# Patient Record
Sex: MALE | Race: AMERICAN INDIAN OR ALASKA NATIVE | NOT HISPANIC OR LATINO | URBAN - METROPOLITAN AREA
[De-identification: names, ages, dates, MRNs, and addresses within clinical notes are randomized per-mention and may not be internally consistent; named-entity substitution may affect disease eponyms.]

---

## 2019-12-11 ENCOUNTER — INPATIENT (INPATIENT)
Facility: HOSPITAL | Age: 49
LOS: 5 days | Discharge: HOME | End: 2019-12-17
Attending: INTERNAL MEDICINE | Admitting: INTERNAL MEDICINE
Payer: MEDICAID

## 2019-12-11 VITALS
OXYGEN SATURATION: 100 % | SYSTOLIC BLOOD PRESSURE: 111 MMHG | HEART RATE: 109 BPM | TEMPERATURE: 98 F | DIASTOLIC BLOOD PRESSURE: 74 MMHG | RESPIRATION RATE: 18 BRPM

## 2019-12-11 LAB
ALBUMIN SERPL ELPH-MCNC: 3.4 G/DL — LOW (ref 3.5–5.2)
ALBUMIN SERPL ELPH-MCNC: 3.5 G/DL — SIGNIFICANT CHANGE UP (ref 3.5–5.2)
ALP SERPL-CCNC: 376 U/L — HIGH (ref 30–115)
ALP SERPL-CCNC: 381 U/L — HIGH (ref 30–115)
ALT FLD-CCNC: 113 U/L — HIGH (ref 0–41)
ALT FLD-CCNC: 114 U/L — HIGH (ref 0–41)
ANION GAP SERPL CALC-SCNC: 12 MMOL/L — SIGNIFICANT CHANGE UP (ref 7–14)
ANION GAP SERPL CALC-SCNC: 16 MMOL/L — HIGH (ref 7–14)
APTT BLD: 31.7 SEC — SIGNIFICANT CHANGE UP (ref 27–39.2)
AST SERPL-CCNC: 66 U/L — HIGH (ref 0–41)
AST SERPL-CCNC: 66 U/L — HIGH (ref 0–41)
B-OH-BUTYR SERPL-SCNC: 0.5 MMOL/L — HIGH
BASE EXCESS BLDV CALC-SCNC: 6.8 MMOL/L — HIGH (ref -2–2)
BASOPHILS # BLD AUTO: 0.05 K/UL — SIGNIFICANT CHANGE UP (ref 0–0.2)
BASOPHILS NFR BLD AUTO: 0.7 % — SIGNIFICANT CHANGE UP (ref 0–1)
BILIRUB DIRECT SERPL-MCNC: >17.2 MG/DL — SIGNIFICANT CHANGE UP (ref 0–0.2)
BILIRUB DIRECT SERPL-MCNC: >8.6 MG/DL — HIGH (ref 0–0.2)
BILIRUB INDIRECT FLD-MCNC: SIGNIFICANT CHANGE UP MG/DL (ref 0.2–1.2)
BILIRUB INDIRECT FLD-MCNC: SIGNIFICANT CHANGE UP MG/DL (ref 0.2–1.2)
BILIRUB SERPL-MCNC: 31.7 MG/DL — CRITICAL HIGH (ref 0.2–1.2)
BILIRUB SERPL-MCNC: 32.4 MG/DL — CRITICAL HIGH (ref 0.2–1.2)
BLD GP AB SCN SERPL QL: SIGNIFICANT CHANGE UP
BUN SERPL-MCNC: 12 MG/DL — SIGNIFICANT CHANGE UP (ref 10–20)
BUN SERPL-MCNC: 13 MG/DL — SIGNIFICANT CHANGE UP (ref 10–20)
CA-I SERPL-SCNC: 1.23 MMOL/L — SIGNIFICANT CHANGE UP (ref 1.12–1.3)
CALCIUM SERPL-MCNC: 8.2 MG/DL — LOW (ref 8.5–10.1)
CALCIUM SERPL-MCNC: 9.4 MG/DL — SIGNIFICANT CHANGE UP (ref 8.5–10.1)
CHLORIDE SERPL-SCNC: 90 MMOL/L — LOW (ref 98–110)
CHLORIDE SERPL-SCNC: 99 MMOL/L — SIGNIFICANT CHANGE UP (ref 98–110)
CO2 SERPL-SCNC: 25 MMOL/L — SIGNIFICANT CHANGE UP (ref 17–32)
CO2 SERPL-SCNC: 27 MMOL/L — SIGNIFICANT CHANGE UP (ref 17–32)
CREAT SERPL-MCNC: <0.5 MG/DL — LOW (ref 0.7–1.5)
CREAT SERPL-MCNC: <0.5 MG/DL — LOW (ref 0.7–1.5)
EOSINOPHIL # BLD AUTO: 0.01 K/UL — SIGNIFICANT CHANGE UP (ref 0–0.7)
EOSINOPHIL NFR BLD AUTO: 0.1 % — SIGNIFICANT CHANGE UP (ref 0–8)
GAS PNL BLDV: 139 MMOL/L — SIGNIFICANT CHANGE UP (ref 136–145)
GAS PNL BLDV: SIGNIFICANT CHANGE UP
GLUCOSE SERPL-MCNC: 450 MG/DL — CRITICAL HIGH (ref 70–99)
GLUCOSE SERPL-MCNC: 620 MG/DL — CRITICAL HIGH (ref 70–99)
HCO3 BLDV-SCNC: 34 MMOL/L — HIGH (ref 22–29)
HCT VFR BLD CALC: 31.8 % — LOW (ref 42–52)
HCT VFR BLDA CALC: 37.1 % — SIGNIFICANT CHANGE UP (ref 34–44)
HGB BLD CALC-MCNC: 12.1 G/DL — LOW (ref 14–18)
HGB BLD-MCNC: 11 G/DL — LOW (ref 14–18)
IMM GRANULOCYTES NFR BLD AUTO: 0.4 % — HIGH (ref 0.1–0.3)
INR BLD: 0.94 RATIO — SIGNIFICANT CHANGE UP (ref 0.65–1.3)
LACTATE BLDV-MCNC: 1.8 MMOL/L — HIGH (ref 0.5–1.6)
LACTATE SERPL-SCNC: 1.9 MMOL/L — SIGNIFICANT CHANGE UP (ref 0.7–2)
LIDOCAIN IGE QN: 4 U/L — LOW (ref 7–60)
LYMPHOCYTES # BLD AUTO: 0.87 K/UL — LOW (ref 1.2–3.4)
LYMPHOCYTES # BLD AUTO: 11.9 % — LOW (ref 20.5–51.1)
MAGNESIUM SERPL-MCNC: 2.1 MG/DL — SIGNIFICANT CHANGE UP (ref 1.8–2.4)
MCHC RBC-ENTMCNC: 30.5 PG — SIGNIFICANT CHANGE UP (ref 27–31)
MCHC RBC-ENTMCNC: 34.6 G/DL — SIGNIFICANT CHANGE UP (ref 32–37)
MCV RBC AUTO: 88.1 FL — SIGNIFICANT CHANGE UP (ref 80–94)
MONOCYTES # BLD AUTO: 0.65 K/UL — HIGH (ref 0.1–0.6)
MONOCYTES NFR BLD AUTO: 8.9 % — SIGNIFICANT CHANGE UP (ref 1.7–9.3)
NEUTROPHILS # BLD AUTO: 5.68 K/UL — SIGNIFICANT CHANGE UP (ref 1.4–6.5)
NEUTROPHILS NFR BLD AUTO: 78 % — HIGH (ref 42.2–75.2)
NRBC # BLD: 0 /100 WBCS — SIGNIFICANT CHANGE UP (ref 0–0)
PCO2 BLDV: 60 MMHG — HIGH (ref 41–51)
PH BLDV: 7.36 — SIGNIFICANT CHANGE UP (ref 7.26–7.43)
PLATELET # BLD AUTO: 437 K/UL — HIGH (ref 130–400)
PO2 BLDV: 20 MMHG — SIGNIFICANT CHANGE UP (ref 20–40)
POTASSIUM BLDV-SCNC: 3.8 MMOL/L — SIGNIFICANT CHANGE UP (ref 3.3–5.6)
POTASSIUM SERPL-MCNC: 4 MMOL/L — SIGNIFICANT CHANGE UP (ref 3.5–5)
POTASSIUM SERPL-MCNC: 4.5 MMOL/L — SIGNIFICANT CHANGE UP (ref 3.5–5)
POTASSIUM SERPL-SCNC: 4 MMOL/L — SIGNIFICANT CHANGE UP (ref 3.5–5)
POTASSIUM SERPL-SCNC: 4.5 MMOL/L — SIGNIFICANT CHANGE UP (ref 3.5–5)
PROT SERPL-MCNC: 5.8 G/DL — LOW (ref 6–8)
PROT SERPL-MCNC: 5.9 G/DL — LOW (ref 6–8)
PROTHROM AB SERPL-ACNC: 10.8 SEC — SIGNIFICANT CHANGE UP (ref 9.95–12.87)
RBC # BLD: 3.61 M/UL — LOW (ref 4.7–6.1)
RBC # FLD: 19.1 % — HIGH (ref 11.5–14.5)
SAO2 % BLDV: 28 % — SIGNIFICANT CHANGE UP
SODIUM SERPL-SCNC: 133 MMOL/L — LOW (ref 135–146)
SODIUM SERPL-SCNC: 136 MMOL/L — SIGNIFICANT CHANGE UP (ref 135–146)
WBC # BLD: 7.29 K/UL — SIGNIFICANT CHANGE UP (ref 4.8–10.8)
WBC # FLD AUTO: 7.29 K/UL — SIGNIFICANT CHANGE UP (ref 4.8–10.8)

## 2019-12-11 PROCEDURE — 71045 X-RAY EXAM CHEST 1 VIEW: CPT | Mod: 26

## 2019-12-11 PROCEDURE — 93010 ELECTROCARDIOGRAM REPORT: CPT

## 2019-12-11 PROCEDURE — 99284 EMERGENCY DEPT VISIT MOD MDM: CPT

## 2019-12-11 PROCEDURE — 74177 CT ABD & PELVIS W/CONTRAST: CPT | Mod: 26

## 2019-12-11 PROCEDURE — 93979 VASCULAR STUDY: CPT | Mod: 26

## 2019-12-11 RX ORDER — SODIUM CHLORIDE 9 MG/ML
1000 INJECTION INTRAMUSCULAR; INTRAVENOUS; SUBCUTANEOUS
Refills: 0 | Status: DISCONTINUED | OUTPATIENT
Start: 2019-12-11 | End: 2019-12-13

## 2019-12-11 RX ORDER — SODIUM CHLORIDE 9 MG/ML
2000 INJECTION INTRAMUSCULAR; INTRAVENOUS; SUBCUTANEOUS ONCE
Refills: 0 | Status: COMPLETED | OUTPATIENT
Start: 2019-12-11 | End: 2019-12-11

## 2019-12-11 RX ORDER — INSULIN HUMAN 100 [IU]/ML
5 INJECTION, SOLUTION SUBCUTANEOUS ONCE
Refills: 0 | Status: COMPLETED | OUTPATIENT
Start: 2019-12-11 | End: 2019-12-11

## 2019-12-11 RX ADMIN — SODIUM CHLORIDE 2000 MILLILITER(S): 9 INJECTION INTRAMUSCULAR; INTRAVENOUS; SUBCUTANEOUS at 18:28

## 2019-12-11 RX ADMIN — SODIUM CHLORIDE 150 MILLILITER(S): 9 INJECTION INTRAMUSCULAR; INTRAVENOUS; SUBCUTANEOUS at 20:06

## 2019-12-11 RX ADMIN — INSULIN HUMAN 5 UNIT(S): 100 INJECTION, SOLUTION SUBCUTANEOUS at 22:04

## 2019-12-11 RX ADMIN — INSULIN HUMAN 5 UNIT(S): 100 INJECTION, SOLUTION SUBCUTANEOUS at 18:30

## 2019-12-11 NOTE — ED PROVIDER NOTE - OBJECTIVE STATEMENT
49 y.o. m w/ no known PMHx, never seen a doctor in over 30 yrs presents with the chief complaint of black stools, jaundice, black urine, and generalized weakness for 2 weeks that all happened acute in onset, he noticed when he woke up from sleep and looked in mirror. It has been progressively worsening. Because of problems getting insurance, he didn't get evaluated till now. Patient also endorses mild 2/10 stinging pain in the right upper quadrant, nonradiating, waxes and wanes for 2 weeks. Patient also admits to mild shortness of breath. Patient also admits to smoking marijuana, quit 5 years ago, smoked 1/2 blunt per day for 15 years. Patient denies any fevers, chills, or night sweats. Patient denies any chest pain, palpitations. Patient denies any nausea, vomiting, or diarrhea.     PMD- none  Pharm- none 49 y.o. m w/ no known PMHx, never seen a doctor in over 30 yrs presents with the chief complaint of black stools, jaundice, black urine, and generalized weakness for 2 weeks that all happened acute in onset, he noticed when he woke up from sleep and looked in mirror. It has been progressively worsening. Because of problems getting insurance, he didn't get evaluated till now. Patient also endorses mild 2/10 stinging pain in the right upper quadrant, non-radiating, waxes and wanes for 2 weeks. Patient also admits to mild shortness of breath. Patient also admits to smoking marijuana, quit 5 years ago, smoked 1/2 blunt per day for 15 years. Patient also admits to 40 lbs weight loss over 3 months. Patient denies any fevers, chills, or night sweats. Patient denies any chest pain, palpitations. Patient denies any nausea, vomiting, or diarrhea.     PMD- none  Pharm- none

## 2019-12-11 NOTE — ED PROVIDER NOTE - ATTENDING CONTRIBUTION TO CARE
50 yo m with no reported pmh, presents with painless jaundice.  pt says noted 2 weeks ago, but did not have insurance to come to hospital.  jaundice worsening.  pt denies n/v/d.  +significant weight loss.  no cp, no sob, no leg swelling or pain.  exam: cachectic, nad, ncat, perrl, eomi, dry mm, rrr, ctab, abd soft, nt,nd aox3, jaundiced, scleral icterus imp; pt with painless jaundice, weight loss, concern for malignancy.  labs, ct a/p

## 2019-12-11 NOTE — ED PROVIDER NOTE - PHYSICAL EXAMINATION
Gen- thin, cachectic, malnourished looking male, diffusely jaundice  Skin- diffuse upper truncal petechiae  Head- atraumatic, normocephalic  Eyes- scleral icterus appreciated  ENT- normal oropharynx  Cardio- normal S1, S2  Pulm- CTA B/L  Abd- nontender, nondistended  Ext- no cyanosis or edema  Neuro- AOx4  Psych- cooperative, appropriate  GUERO negative for blood

## 2019-12-11 NOTE — ED PROVIDER NOTE - CARE PLAN
Relevant Problems   No relevant active problems       Anesthetic History   No history of anesthetic complications            Review of Systems / Medical History  Patient summary reviewed, nursing notes reviewed and pertinent labs reviewed    Pulmonary  Within defined limits                 Neuro/Psych   Within defined limits           Cardiovascular                  Exercise tolerance: >4 METS     GI/Hepatic/Renal     GERD: well controlled    Renal disease: stones and CRI       Endo/Other  Within defined limits           Other Findings            Physical Exam    Airway  Mallampati: II  TM Distance: 4 - 6 cm  Neck ROM: normal range of motion   Mouth opening: Normal     Cardiovascular    Rhythm: regular  Rate: normal         Dental      Comments: misssing many teeth on lower and upper back right   Pulmonary  Breath sounds clear to auscultation               Abdominal  GI exam deferred       Other Findings            Anesthetic Plan    ASA: 2  Anesthesia type: general          Induction: Intravenous  Anesthetic plan and risks discussed with: Patient Principal Discharge DX:	Pancreatic mass  Secondary Diagnosis:	Jaundice  Secondary Diagnosis:	Hyperglycemia

## 2019-12-11 NOTE — ED PROVIDER NOTE - CLINICAL SUMMARY MEDICAL DECISION MAKING FREE TEXT BOX
Pt with painless jaundice, found to have pancreatic mass, ill defined on CT, also with hyperglycemia, mild anion gap initially that resolved with ivf on rpt labs, glucose improving with fluids and insulin, ICU consulted but deemed stable for floor

## 2019-12-11 NOTE — ED ADULT NURSE NOTE - OBJECTIVE STATEMENT
49 year old male presents to the er with jaundice for two weeks & weakness. states hes been having bloody stools for about a month. patient states he has intermittent sob, and non radiating chest pain. patient denies any abdominal pain, states he has dark orange urine denies dysuira. denies any drug use or alcohol . states he hasn't seen a md for over a decade

## 2019-12-12 LAB
ALBUMIN SERPL ELPH-MCNC: 3.3 G/DL — LOW (ref 3.5–5.2)
ALP SERPL-CCNC: 353 U/L — HIGH (ref 30–115)
ALT FLD-CCNC: 103 U/L — HIGH (ref 0–41)
ANION GAP SERPL CALC-SCNC: 15 MMOL/L — HIGH (ref 7–14)
ANION GAP SERPL CALC-SCNC: 16 MMOL/L — HIGH (ref 7–14)
APTT BLD: 29.5 SEC — SIGNIFICANT CHANGE UP (ref 27–39.2)
AST SERPL-CCNC: 62 U/L — HIGH (ref 0–41)
BASOPHILS # BLD AUTO: 0.07 K/UL — SIGNIFICANT CHANGE UP (ref 0–0.2)
BASOPHILS NFR BLD AUTO: 1 % — SIGNIFICANT CHANGE UP (ref 0–1)
BILIRUB DIRECT SERPL-MCNC: >17.2 MG/DL — HIGH (ref 0–0.2)
BILIRUB INDIRECT FLD-MCNC: <10.8 MG/DL — HIGH (ref 0.2–1.2)
BILIRUB SERPL-MCNC: 28 MG/DL — CRITICAL HIGH (ref 0.2–1.2)
BUN SERPL-MCNC: 11 MG/DL — SIGNIFICANT CHANGE UP (ref 10–20)
BUN SERPL-MCNC: 9 MG/DL — LOW (ref 10–20)
CALCIUM SERPL-MCNC: 8.4 MG/DL — LOW (ref 8.5–10.1)
CALCIUM SERPL-MCNC: 8.4 MG/DL — LOW (ref 8.5–10.1)
CHLORIDE SERPL-SCNC: 95 MMOL/L — LOW (ref 98–110)
CHLORIDE SERPL-SCNC: 99 MMOL/L — SIGNIFICANT CHANGE UP (ref 98–110)
CO2 SERPL-SCNC: 21 MMOL/L — SIGNIFICANT CHANGE UP (ref 17–32)
CO2 SERPL-SCNC: 23 MMOL/L — SIGNIFICANT CHANGE UP (ref 17–32)
CREAT SERPL-MCNC: 0.5 MG/DL — LOW (ref 0.7–1.5)
CREAT SERPL-MCNC: 0.7 MG/DL — SIGNIFICANT CHANGE UP (ref 0.7–1.5)
EOSINOPHIL # BLD AUTO: 0.08 K/UL — SIGNIFICANT CHANGE UP (ref 0–0.7)
EOSINOPHIL NFR BLD AUTO: 1.1 % — SIGNIFICANT CHANGE UP (ref 0–8)
ESTIMATED AVERAGE GLUCOSE: 197 MG/DL — HIGH (ref 68–114)
GLUCOSE BLDC GLUCOMTR-MCNC: 221 MG/DL — HIGH (ref 70–99)
GLUCOSE BLDC GLUCOMTR-MCNC: 242 MG/DL — HIGH (ref 70–99)
GLUCOSE BLDC GLUCOMTR-MCNC: 296 MG/DL — HIGH (ref 70–99)
GLUCOSE BLDC GLUCOMTR-MCNC: 309 MG/DL — HIGH (ref 70–99)
GLUCOSE BLDC GLUCOMTR-MCNC: 342 MG/DL — HIGH (ref 70–99)
GLUCOSE BLDC GLUCOMTR-MCNC: 402 MG/DL — HIGH (ref 70–99)
GLUCOSE SERPL-MCNC: 234 MG/DL — HIGH (ref 70–99)
GLUCOSE SERPL-MCNC: 364 MG/DL — HIGH (ref 70–99)
HBA1C BLD-MCNC: 8.5 % — HIGH (ref 4–5.6)
HCT VFR BLD CALC: 30.3 % — LOW (ref 42–52)
HGB BLD-MCNC: 10.4 G/DL — LOW (ref 14–18)
HIV 1+2 AB+HIV1 P24 AG SERPL QL IA: SIGNIFICANT CHANGE UP
IMM GRANULOCYTES NFR BLD AUTO: 0.4 % — HIGH (ref 0.1–0.3)
INR BLD: 0.94 RATIO — SIGNIFICANT CHANGE UP (ref 0.65–1.3)
LYMPHOCYTES # BLD AUTO: 1.31 K/UL — SIGNIFICANT CHANGE UP (ref 1.2–3.4)
LYMPHOCYTES # BLD AUTO: 18.2 % — LOW (ref 20.5–51.1)
MAGNESIUM SERPL-MCNC: 1.8 MG/DL — SIGNIFICANT CHANGE UP (ref 1.8–2.4)
MCHC RBC-ENTMCNC: 30.1 PG — SIGNIFICANT CHANGE UP (ref 27–31)
MCHC RBC-ENTMCNC: 34.3 G/DL — SIGNIFICANT CHANGE UP (ref 32–37)
MCV RBC AUTO: 87.6 FL — SIGNIFICANT CHANGE UP (ref 80–94)
MONOCYTES # BLD AUTO: 0.71 K/UL — HIGH (ref 0.1–0.6)
MONOCYTES NFR BLD AUTO: 9.9 % — HIGH (ref 1.7–9.3)
NEUTROPHILS # BLD AUTO: 4.99 K/UL — SIGNIFICANT CHANGE UP (ref 1.4–6.5)
NEUTROPHILS NFR BLD AUTO: 69.4 % — SIGNIFICANT CHANGE UP (ref 42.2–75.2)
NRBC # BLD: 0 /100 WBCS — SIGNIFICANT CHANGE UP (ref 0–0)
PLATELET # BLD AUTO: 408 K/UL — HIGH (ref 130–400)
POTASSIUM SERPL-MCNC: 3.4 MMOL/L — LOW (ref 3.5–5)
POTASSIUM SERPL-MCNC: 3.7 MMOL/L — SIGNIFICANT CHANGE UP (ref 3.5–5)
POTASSIUM SERPL-SCNC: 3.4 MMOL/L — LOW (ref 3.5–5)
POTASSIUM SERPL-SCNC: 3.7 MMOL/L — SIGNIFICANT CHANGE UP (ref 3.5–5)
PROT SERPL-MCNC: 5.5 G/DL — LOW (ref 6–8)
PROTHROM AB SERPL-ACNC: 10.8 SEC — SIGNIFICANT CHANGE UP (ref 9.95–12.87)
RBC # BLD: 3.46 M/UL — LOW (ref 4.7–6.1)
RBC # FLD: 20.6 % — HIGH (ref 11.5–14.5)
SODIUM SERPL-SCNC: 134 MMOL/L — LOW (ref 135–146)
SODIUM SERPL-SCNC: 135 MMOL/L — SIGNIFICANT CHANGE UP (ref 135–146)
WBC # BLD: 7.19 K/UL — SIGNIFICANT CHANGE UP (ref 4.8–10.8)
WBC # FLD AUTO: 7.19 K/UL — SIGNIFICANT CHANGE UP (ref 4.8–10.8)

## 2019-12-12 PROCEDURE — 73030 X-RAY EXAM OF SHOULDER: CPT | Mod: 26,50

## 2019-12-12 PROCEDURE — 99223 1ST HOSP IP/OBS HIGH 75: CPT

## 2019-12-12 PROCEDURE — 74181 MRI ABDOMEN W/O CONTRAST: CPT | Mod: 26

## 2019-12-12 RX ORDER — ENOXAPARIN SODIUM 100 MG/ML
40 INJECTION SUBCUTANEOUS DAILY
Refills: 0 | Status: DISCONTINUED | OUTPATIENT
Start: 2019-12-12 | End: 2019-12-17

## 2019-12-12 RX ORDER — DEXTROSE 50 % IN WATER 50 %
25 SYRINGE (ML) INTRAVENOUS ONCE
Refills: 0 | Status: DISCONTINUED | OUTPATIENT
Start: 2019-12-12 | End: 2019-12-13

## 2019-12-12 RX ORDER — DEXTROSE 50 % IN WATER 50 %
12.5 SYRINGE (ML) INTRAVENOUS ONCE
Refills: 0 | Status: DISCONTINUED | OUTPATIENT
Start: 2019-12-12 | End: 2019-12-13

## 2019-12-12 RX ORDER — GLUCAGON INJECTION, SOLUTION 0.5 MG/.1ML
1 INJECTION, SOLUTION SUBCUTANEOUS ONCE
Refills: 0 | Status: DISCONTINUED | OUTPATIENT
Start: 2019-12-12 | End: 2019-12-17

## 2019-12-12 RX ORDER — INSULIN GLARGINE 100 [IU]/ML
15 INJECTION, SOLUTION SUBCUTANEOUS AT BEDTIME
Refills: 0 | Status: DISCONTINUED | OUTPATIENT
Start: 2019-12-12 | End: 2019-12-14

## 2019-12-12 RX ORDER — INSULIN LISPRO 100/ML
VIAL (ML) SUBCUTANEOUS
Refills: 0 | Status: DISCONTINUED | OUTPATIENT
Start: 2019-12-12 | End: 2019-12-17

## 2019-12-12 RX ORDER — INSULIN LISPRO 100/ML
8 VIAL (ML) SUBCUTANEOUS ONCE
Refills: 0 | Status: COMPLETED | OUTPATIENT
Start: 2019-12-12 | End: 2019-12-12

## 2019-12-12 RX ORDER — POTASSIUM CHLORIDE 20 MEQ
20 PACKET (EA) ORAL
Refills: 0 | Status: COMPLETED | OUTPATIENT
Start: 2019-12-12 | End: 2019-12-12

## 2019-12-12 RX ORDER — SODIUM CHLORIDE 9 MG/ML
1000 INJECTION, SOLUTION INTRAVENOUS
Refills: 0 | Status: DISCONTINUED | OUTPATIENT
Start: 2019-12-12 | End: 2019-12-13

## 2019-12-12 RX ORDER — INSULIN LISPRO 100/ML
5 VIAL (ML) SUBCUTANEOUS
Refills: 0 | Status: DISCONTINUED | OUTPATIENT
Start: 2019-12-12 | End: 2019-12-15

## 2019-12-12 RX ORDER — DEXTROSE 50 % IN WATER 50 %
15 SYRINGE (ML) INTRAVENOUS ONCE
Refills: 0 | Status: DISCONTINUED | OUTPATIENT
Start: 2019-12-12 | End: 2019-12-13

## 2019-12-12 RX ADMIN — Medication 3: at 18:15

## 2019-12-12 RX ADMIN — Medication 50 MILLIEQUIVALENT(S): at 14:40

## 2019-12-12 RX ADMIN — SODIUM CHLORIDE 150 MILLILITER(S): 9 INJECTION INTRAMUSCULAR; INTRAVENOUS; SUBCUTANEOUS at 02:27

## 2019-12-12 RX ADMIN — Medication 5 UNIT(S): at 18:15

## 2019-12-12 RX ADMIN — Medication 4: at 12:07

## 2019-12-12 RX ADMIN — Medication 8 UNIT(S): at 23:29

## 2019-12-12 RX ADMIN — SODIUM CHLORIDE 150 MILLILITER(S): 9 INJECTION INTRAMUSCULAR; INTRAVENOUS; SUBCUTANEOUS at 12:08

## 2019-12-12 RX ADMIN — INSULIN GLARGINE 15 UNIT(S): 100 INJECTION, SOLUTION SUBCUTANEOUS at 23:29

## 2019-12-12 RX ADMIN — Medication 5 UNIT(S): at 12:08

## 2019-12-12 RX ADMIN — Medication 5 UNIT(S): at 08:48

## 2019-12-12 RX ADMIN — Medication 50 MILLIEQUIVALENT(S): at 16:21

## 2019-12-12 RX ADMIN — Medication 2: at 08:46

## 2019-12-12 NOTE — H&P ADULT - ASSESSMENT
49 year old M with no known PMHx presents to the ED for evaluation of jaundice.    # Painless obstructive jaundice:  - cholestatic transaminitis with total bili 32.4 predominantly direct, , AST 66,   - CT abdomen is highly suspicious of pancreatic head mass with CBD dilation to 1.7, pancreatic duct dilation, intra and extra hepatic duct dilation  - will trend hepatic function, INR daily  - will get [facility] for evaluation of ERCP + Stent + EUS    # Hyperglycemia:   - not on any medications, insulin regimen to control, monitor FS  - AG improved from 16 to 12, will get HbA1C    DVT ppx: Lovenox  GI ppx: ot indicated  from home  full code  CHO consistent diet

## 2019-12-12 NOTE — H&P ADULT - NSHPLABSRESULTS_GEN_ALL_CORE
< end of copied text >    < from: CT Abdomen and Pelvis w/ IV Cont (12.11.19 @ 19:18) >      IMPRESSION:     Severe pancreatic duct dilatation measuring 9 mm with distal pancreatic   atrophy.    Intrahepatic and extrahepatic biliary duct dilatation with the common   bile duct measuring up to 1.7 cm in the head of the pancreas.    Abrupt cut off of the pancreatic duct and common bile duct in the head of   pancreas, findings are highly suspicious for pancreatic head mass. A   discrete lesion is difficult to identify on this examination.    Infiltration of retroperitoneal fat around the aorta, IVC, and large   vessels off the aorta. Significant loss of retroperitoneal and mesenteric   fat. Infiltration may be related to ascites and edema. However, malignant   ascites or infiltration is not excluded.    Subcentimeter nodules in the left lung measuring up to 8 mm.    Severe loss of subcutaneous, retroperitoneal and mesenteric fat    Stomach is severely distended with material    < end of copied text >      Basic Metabolic Panel - STAT (12.11.19 @ 19:30)    Sodium, Serum: 136 mmol/L    Potassium, Serum: 4.5: Hemolyzed. Interpret with caution mmol/L    Chloride, Serum: 99 mmol/L    Carbon Dioxide, Serum: 25 mmol/L    Anion Gap, Serum: 12 mmol/L    Blood Urea Nitrogen, Serum: 13 mg/dL    Creatinine, Serum: <0.5: Icteric. Interpret with caution mg/dL    Glucose, Serum: 450: Critical value:  TYPE:(C=Critical, N=Notification, A=Abnormal) _  TESTS: _  DATE/TIME CALLED: _  CALLED TO: _dr jimenez  READ BACK (2 Patient Identifiers)(Y/N): _  READ BACK VALUES (Y/N): _  CALLED BY: _ mg/dL    Calcium, Total Serum: 8.2 mg/dL    eGFR if Non : 185: Interpretative comment  The units for eGFR are mL/min/1.73M2 (normalized body surface area). The  eGFR is calculated from a serum creatinine using the CKD-EPI equation.  Other variables required for calculation are race, age and sex. Among  patients with chronic kidney disease (CKD), the eGFR is useful in  determining the stage of disease according to KDOQI CKD classification.  All eGFR results are reported numerically with the following  interpretation.          GFR                    With                 Without     (ml/min/1.73 m2)    Kidney Damage       Kidney Damage        >= 90                    Stage 1                     Normal        60-89                    Stage 2                     Decreased GFR        30-59     Stage 3                     Stage 3        15-29                    Stage 4                     Stage 4        < 15                      Stage 5                     Stage 5  Each stage of CKD assumes that the associated GFR level has been in  effect for at least 3 months. Determination of stages one and two (with  eGFR > 59 ml/min/m2) requires estimation of kidney damage for at least 3  months as defined by structural or functional abnormalities.  Limitations: All estimates of GFR will be less accurate for patients at  extremes of muscle mass (including but not limited to frail elderly,  critically ill, or cancer patients), those with unusual diets, and those  with conditions associated with reduced secretion or extrarenal  elimination of creatinine. The eGFR equation is not recommended for use  in patients with unstable creatinine levels. mL/min/1.73M2    eGFR if African American: 215 mL/min/1.73M2

## 2019-12-12 NOTE — H&P ADULT - HISTORY OF PRESENT ILLNESS
49 year old M with no known PMHx presents to the ED for evaluation of jaundice. patient reports he noticed diffuse jaundice 2 weeks ago with dark urine and stool. he also endorses generalized weakness over the past 1 month and loss of tremendous amount of wt over the past 6 months. for insurance issue he could not come to get evaluated. Patient also endorses mild 2/10 dull aching pain in the right upper quadrant, non-radiating, waxes and wanes for 2 weeks. denies fever, chills, nausea, abdominal pain, chest pain, sick contacts or recent travel. 49 year old M with no known PMHx presents to the ED for evaluation of jaundice. patient reports he noticed diffuse jaundice 2 weeks ago with dark urine and stool. he also endorses generalized weakness over the past 1 month and loss of tremendous amount of wt over the past 6 months. for insurance issue he could not come to get evaluated. Patient also endorses mild 2/10 dull aching pain in the right upper quadrant, non-radiating, waxes and wanes for 2 weeks. denies fever, chills, nausea, abdominal pain, chest pain, sick contacts or recent travel.     in the ED patient is hemodynamically stable.  found to be in mild DKA, received IV fluids and insulin in the ED.

## 2019-12-12 NOTE — CONSULT NOTE ADULT - SUBJECTIVE AND OBJECTIVE BOX
Patient is a 48 y/o with no known PMHx that presents to the ED for evaluation of jaundice. Patient notes for the last few weeks he has been jaundice. He has had approximately 40lb weight loss in a few month span. He notes no preventative medical visits and no prior CRC screen. He infrequent seeks medical care due to lack of insurance. He notes his stools have been oily and occasional black. He notes no actually dysphagia, abdominal pain, nausea, emesis, fever, chills, herbal medication use, drug/ ETOH use, or recent travel. He notes his father had required stenting of his bile ducts but was not sure why ( he notes his father passed away from DM complications without a know malignant process). Of note in the ED for to be in DKA.       PAST MEDICAL & SURGICAL HISTORY:  No pertinent past medical history  No significant past surgical history    Family Hx:  Father: Biliary stenting?   Mother: Non Contributory    Social History  Denies Current Tobacco use  Denies Current ETOH use  Denies Current Illicit Drug use     MEDICATIONS  (STANDING):  dextrose 5%. 1000 milliLiter(s) (50 mL/Hr) IV Continuous <Continuous>  dextrose 50% Injectable 12.5 Gram(s) IV Push once  dextrose 50% Injectable 25 Gram(s) IV Push once  dextrose 50% Injectable 25 Gram(s) IV Push once  enoxaparin Injectable 40 milliGRAM(s) SubCutaneous daily  insulin glargine Injectable (LANTUS) 15 Unit(s) SubCutaneous at bedtime  insulin lispro (HumaLOG) corrective regimen sliding scale   SubCutaneous three times a day before meals  insulin lispro Injectable (HumaLOG) 5 Unit(s) SubCutaneous three times a day before meals  potassium chloride  20 mEq/100 mL IVPB 20 milliEquivalent(s) IV Intermittent every 2 hours  sodium chloride 0.9%. 1000 milliLiter(s) (150 mL/Hr) IV Continuous <Continuous>    MEDICATIONS  (PRN):  dextrose 40% Gel 15 Gram(s) Oral once PRN Blood Glucose LESS THAN 70 milliGRAM(s)/deciliter  glucagon  Injectable 1 milliGRAM(s) IntraMuscular once PRN Glucose LESS THAN 70 milligrams/deciliter      Allergies  No Known Allergies        Review of Systems  General:  See HPI  HEENT: Denies Trouble Swallowing ,Denies  Sore Throat , Denies Change in hearing/vision/speech ,Denies Dizziness    Cardio: Denies  Chest Pain , Palpitations    Respiratory: Denies worsening of SOB, Denies Cough  Abdomen: See detailed HPI  Neuro: Denies Headache Denies Dizziness, Denies Paresthesias  MSK: Denies pain in Bones/Joints/Muscles   Psych: Patient denies depression, denies suicidal or homicidal ideations  Integ: Patient Denies rash, or new skin lesions     Vital Signs:  T(F): 98.5 (12 Dec 2019 07:56), Max: 98.5 (12 Dec 2019 07:56)  HR: 75 (12 Dec 2019 07:56) (75 - 109)  BP: 102/57 (12 Dec 2019 07:56) (102/57 - 119/80)  RR: 18 (12 Dec 2019 07:56) (18 - 18)  SpO2: 99% (12 Dec 2019 07:56) (98% - 100%)  Physical Exam  Gen: NAD  HEENT: NC/AT, Icteric Sclera   Neck: Supple  Cardio: S1/S2 No S3/S4, Regular  Resp: CTA B/L  Abdomen: Soft, ND/NT  Neuro: AAOx3  Extremities: FROM x 4  Integ: Jaundice noted, excoriations likely from itching       LABS:                        10.4   7.19  )-----------( 408      ( 12 Dec 2019 07:24 )             30.3     12-12    134<L>  |  95<L>  |  9<L>  ----------------------------<  234<H>  3.4<L>   |  23  |  0.5<L>    Ca    8.4<L>      12 Dec 2019 07:24  Mg     1.8     12-12    TPro  5.5<L>  /  Alb  3.3<L>  /  TBili  28.0<HH>  /  DBili  >17.2<H>  /  AST  62<H>  /  ALT  103<H>  /  AlkPhos  353<H>  12-12      PT/INR - ( 12 Dec 2019 07:24 )   PT: 10.80 sec;   INR: 0.94 ratio         PTT - ( 12 Dec 2019 07:24 )  PTT:29.5 sec      RADIOLOGY & ADDITIONAL STUDIES:  CT Abdomen and Pelvis w/ IV Cont 12.11.19   IMPRESSION:     Severe pancreatic duct dilatation measuring 9 mm with distal pancreatic   atrophy.    Intrahepatic and extrahepatic biliary duct dilatation with the common   bile duct measuring up to 1.7 cm in the head of the pancreas.    Abrupt cut off of the pancreatic duct and common bile duct in the head of   pancreas, findings are highly suspicious for pancreatic head mass. A   discrete lesion is difficult to identify on this examination.    Infiltration of retroperitoneal fat around the aorta, IVC, and large   vessels off the aorta. Significant loss of retroperitoneal and mesenteric   fat. Infiltration may be related to ascites and edema. However, malignant   ascites or infiltration is not excluded.    Subcentimeter nodules in the left lung measuring up to 8 mm.    Severe loss of subcutaneous, retroperitoneal and mesenteric fat    Stomach is severely distended with material

## 2019-12-12 NOTE — H&P ADULT - ATTENDING COMMENTS
The patient was seen and examined at bedside.  Agree with above a/p by resident.    A/P :  Obstructive Jaundice possibly 2/2 Pancreatic head mass  Complicated by  gastroparesis sec/2 DKA ( newly diagnosed, Hb A1c 8.5)    GI eval noted, f/u with MRCP > ERCP, Tumor markers.   No need for insulin drip, as Anion gap is getting closed.  c/w current SQ insulin regimen.  Monitor FS.    DVT/GI ppx.  Will follow.   Monitor FS,

## 2019-12-12 NOTE — H&P ADULT - NSHPPHYSICALEXAM_GEN_ALL_CORE
GENERAL: NAD, lying in bed comfortably, diffusely jaundice   CHEST/LUNG: Clear to auscultation bilaterally; No rales, rhonchi, wheezing, or rubs.   HEART: Regular rate and rhythm; No murmurs, rubs, or gallops  ABDOMEN: Bowel sounds present; Soft, Nontender, Nondistended.   EXTREMITIES:  2+ Peripheral Pulses, brisk capillary refill. No edema  NERVOUS SYSTEM:  Alert & Oriented X3, speech clear. No deficits

## 2019-12-12 NOTE — PATIENT PROFILE ADULT - BRADEN NUTRITION
Infusion Nursing Note:  Rachele Martínez presents today for Sandostatin.    Patient seen by provider today: No   present during visit today: Not Applicable.    Note: N/A.    Intravenous Access:  No Intravenous access/labs at this visit.    Treatment Conditions:  Not Applicable.    Post Infusion Assessment:  Patient tolerated injection without incident.  Site patent and intact, free from redness, edema or discomfort.    Discharge Plan:   Patient discharged in stable condition accompanied by: daughter.  Departure Mode: Ambulatory.    Holly Méndez RN                         (2) probably inadequate

## 2019-12-13 PROBLEM — Z00.00 ENCOUNTER FOR PREVENTIVE HEALTH EXAMINATION: Status: ACTIVE | Noted: 2019-12-13

## 2019-12-13 LAB
ALBUMIN SERPL ELPH-MCNC: 3.2 G/DL — LOW (ref 3.5–5.2)
ALP SERPL-CCNC: 359 U/L — HIGH (ref 30–115)
ALT FLD-CCNC: 101 U/L — HIGH (ref 0–41)
ANION GAP SERPL CALC-SCNC: 13 MMOL/L — SIGNIFICANT CHANGE UP (ref 7–14)
AST SERPL-CCNC: 66 U/L — HIGH (ref 0–41)
BASOPHILS # BLD AUTO: 0.07 K/UL — SIGNIFICANT CHANGE UP (ref 0–0.2)
BASOPHILS NFR BLD AUTO: 0.8 % — SIGNIFICANT CHANGE UP (ref 0–1)
BILIRUB DIRECT SERPL-MCNC: >17.2 MG/DL — HIGH (ref 0–0.2)
BILIRUB INDIRECT FLD-MCNC: <8.8 MG/DL — HIGH (ref 0.2–1.2)
BILIRUB SERPL-MCNC: 26 MG/DL — CRITICAL HIGH (ref 0.2–1.2)
BUN SERPL-MCNC: 11 MG/DL — SIGNIFICANT CHANGE UP (ref 10–20)
CALCIUM SERPL-MCNC: 8.8 MG/DL — SIGNIFICANT CHANGE UP (ref 8.5–10.1)
CANCER AG19-9 SERPL-ACNC: 617 U/ML — HIGH
CEA SERPL-MCNC: 10.3 NG/ML — HIGH (ref 0–3.8)
CHLORIDE SERPL-SCNC: 99 MMOL/L — SIGNIFICANT CHANGE UP (ref 98–110)
CO2 SERPL-SCNC: 25 MMOL/L — SIGNIFICANT CHANGE UP (ref 17–32)
CREAT SERPL-MCNC: 0.5 MG/DL — LOW (ref 0.7–1.5)
EOSINOPHIL # BLD AUTO: 0.1 K/UL — SIGNIFICANT CHANGE UP (ref 0–0.7)
EOSINOPHIL NFR BLD AUTO: 1.2 % — SIGNIFICANT CHANGE UP (ref 0–8)
GLUCOSE BLDC GLUCOMTR-MCNC: 126 MG/DL — HIGH (ref 70–99)
GLUCOSE BLDC GLUCOMTR-MCNC: 202 MG/DL — HIGH (ref 70–99)
GLUCOSE BLDC GLUCOMTR-MCNC: 211 MG/DL — HIGH (ref 70–99)
GLUCOSE BLDC GLUCOMTR-MCNC: 280 MG/DL — HIGH (ref 70–99)
GLUCOSE BLDC GLUCOMTR-MCNC: 316 MG/DL — HIGH (ref 70–99)
GLUCOSE BLDC GLUCOMTR-MCNC: 417 MG/DL — HIGH (ref 70–99)
GLUCOSE SERPL-MCNC: 135 MG/DL — HIGH (ref 70–99)
HCT VFR BLD CALC: 32.8 % — LOW (ref 42–52)
HGB BLD-MCNC: 11.4 G/DL — LOW (ref 14–18)
IMM GRANULOCYTES NFR BLD AUTO: 0.5 % — HIGH (ref 0.1–0.3)
INR BLD: 0.96 RATIO — SIGNIFICANT CHANGE UP (ref 0.65–1.3)
LYMPHOCYTES # BLD AUTO: 1.42 K/UL — SIGNIFICANT CHANGE UP (ref 1.2–3.4)
LYMPHOCYTES # BLD AUTO: 16.9 % — LOW (ref 20.5–51.1)
MAGNESIUM SERPL-MCNC: 1.8 MG/DL — SIGNIFICANT CHANGE UP (ref 1.8–2.4)
MCHC RBC-ENTMCNC: 30 PG — SIGNIFICANT CHANGE UP (ref 27–31)
MCHC RBC-ENTMCNC: 34.8 G/DL — SIGNIFICANT CHANGE UP (ref 32–37)
MCV RBC AUTO: 86.3 FL — SIGNIFICANT CHANGE UP (ref 80–94)
MONOCYTES # BLD AUTO: 0.95 K/UL — HIGH (ref 0.1–0.6)
MONOCYTES NFR BLD AUTO: 11.3 % — HIGH (ref 1.7–9.3)
NEUTROPHILS # BLD AUTO: 5.8 K/UL — SIGNIFICANT CHANGE UP (ref 1.4–6.5)
NEUTROPHILS NFR BLD AUTO: 69.3 % — SIGNIFICANT CHANGE UP (ref 42.2–75.2)
NRBC # BLD: 0 /100 WBCS — SIGNIFICANT CHANGE UP (ref 0–0)
PLATELET # BLD AUTO: 436 K/UL — HIGH (ref 130–400)
POTASSIUM SERPL-MCNC: 3.9 MMOL/L — SIGNIFICANT CHANGE UP (ref 3.5–5)
POTASSIUM SERPL-SCNC: 3.9 MMOL/L — SIGNIFICANT CHANGE UP (ref 3.5–5)
PROT SERPL-MCNC: 5.4 G/DL — LOW (ref 6–8)
PROTHROM AB SERPL-ACNC: 11.1 SEC — SIGNIFICANT CHANGE UP (ref 9.95–12.87)
RBC # BLD: 3.8 M/UL — LOW (ref 4.7–6.1)
RBC # FLD: 20.5 % — HIGH (ref 11.5–14.5)
SODIUM SERPL-SCNC: 137 MMOL/L — SIGNIFICANT CHANGE UP (ref 135–146)
WBC # BLD: 8.38 K/UL — SIGNIFICANT CHANGE UP (ref 4.8–10.8)
WBC # FLD AUTO: 8.38 K/UL — SIGNIFICANT CHANGE UP (ref 4.8–10.8)

## 2019-12-13 PROCEDURE — 99233 SBSQ HOSP IP/OBS HIGH 50: CPT

## 2019-12-13 RX ORDER — INSULIN LISPRO 100/ML
10 VIAL (ML) SUBCUTANEOUS ONCE
Refills: 0 | Status: COMPLETED | OUTPATIENT
Start: 2019-12-13 | End: 2019-12-13

## 2019-12-13 RX ORDER — PANTOPRAZOLE SODIUM 20 MG/1
40 TABLET, DELAYED RELEASE ORAL DAILY
Refills: 0 | Status: DISCONTINUED | OUTPATIENT
Start: 2019-12-13 | End: 2019-12-17

## 2019-12-13 RX ORDER — INSULIN LISPRO 100/ML
8 VIAL (ML) SUBCUTANEOUS ONCE
Refills: 0 | Status: COMPLETED | OUTPATIENT
Start: 2019-12-13 | End: 2019-12-13

## 2019-12-13 RX ADMIN — PANTOPRAZOLE SODIUM 40 MILLIGRAM(S): 20 TABLET, DELAYED RELEASE ORAL at 13:28

## 2019-12-13 RX ADMIN — Medication 8 UNIT(S): at 02:28

## 2019-12-13 RX ADMIN — Medication 50 MILLIEQUIVALENT(S): at 00:00

## 2019-12-13 RX ADMIN — INSULIN GLARGINE 15 UNIT(S): 100 INJECTION, SOLUTION SUBCUTANEOUS at 21:30

## 2019-12-13 RX ADMIN — Medication 10 UNIT(S): at 21:47

## 2019-12-13 NOTE — PROGRESS NOTE ADULT - SUBJECTIVE AND OBJECTIVE BOX
SUBJECTIVE:    Patient is a 49y old Male who presents with a chief complaint of pancreatic mass (12 Dec 2019 13:37)    Currently admitted to medicine with the primary diagnosis of Pancreatic mass     Today is hospital day 2d. This morning he is resting comfortably in bed and reports no new issues or overnight events.     PAST MEDICAL & SURGICAL HISTORY  No pertinent past medical history  No significant past surgical history    SOCIAL HISTORY:    ALLERGIES:  No Known Allergies    MEDICATIONS:  STANDING MEDICATIONS  dextrose 5%. 1000 milliLiter(s) IV Continuous <Continuous>  dextrose 50% Injectable 12.5 Gram(s) IV Push once  dextrose 50% Injectable 25 Gram(s) IV Push once  dextrose 50% Injectable 25 Gram(s) IV Push once  enoxaparin Injectable 40 milliGRAM(s) SubCutaneous daily  insulin glargine Injectable (LANTUS) 15 Unit(s) SubCutaneous at bedtime  insulin lispro (HumaLOG) corrective regimen sliding scale   SubCutaneous three times a day before meals  insulin lispro Injectable (HumaLOG) 5 Unit(s) SubCutaneous three times a day before meals  sodium chloride 0.9%. 1000 milliLiter(s) IV Continuous <Continuous>    PRN MEDICATIONS  dextrose 40% Gel 15 Gram(s) Oral once PRN  glucagon  Injectable 1 milliGRAM(s) IntraMuscular once PRN    VITALS:   T(F): 97.9  HR: 85  BP: 104/62  RR: 18  SpO2: 98%    LABS:                        10.4   7.19  )-----------( 408      ( 12 Dec 2019 07:24 )             30.3     12-12    134<L>  |  95<L>  |  9<L>  ----------------------------<  234<H>  3.4<L>   |  23  |  0.5<L>    Ca    8.4<L>      12 Dec 2019 07:24  Mg     1.8     12-12    TPro  5.5<L>  /  Alb  3.3<L>  /  TBili  28.0<HH>  /  DBili  >17.2<H>  /  AST  62<H>  /  ALT  103<H>  /  AlkPhos  353<H>  12-12    PT/INR - ( 12 Dec 2019 07:24 )   PT: 10.80 sec;   INR: 0.94 ratio         PTT - ( 12 Dec 2019 07:24 )  PTT:29.5 sec          Culture - Blood (collected 11 Dec 2019 16:30)  Source: .Blood Blood  Preliminary Report (13 Dec 2019 01:01):    No growth to date.              RADIOLOGY:    PHYSICAL EXAM:    GENERAL: NAD, lying in bed comfortably, diffusely jaundice   CHEST/LUNG: Clear to auscultation bilaterally; No rales, rhonchi, wheezing, or rubs.   HEART: Regular rate and rhythm; No murmurs, rubs, or gallops  ABDOMEN: Bowel sounds present; Soft, Nontender, Nondistended.   EXTREMITIES:  2+ Peripheral Pulses, brisk capillary refill. No edema  NERVOUS SYSTEM:  Alert & Oriented X3, speech clear. No deficits

## 2019-12-13 NOTE — DIETITIAN INITIAL EVALUATION ADULT. - ETIOLOGY
lethargy, poor appetite & abd discomfort, possibly related to underlying metabolic process (will monitor clinical course, no PMH noted)

## 2019-12-13 NOTE — DIETITIAN INITIAL EVALUATION ADULT. - OTHER INFO
Pertinent Medical Information: Admitted for evaluation of jaundice. Painless obstructive jaundice noted. Pancreatic mass noted. Hyperglycemia upon admission - elevated HgbA1C noted at this time.    Pertinent Subjective Information: Poor po intake over past 6 months d/t increasing weakness, poor appetite & abd discomfort/distention. Regular diet PTP. Demonstrates limited knowledge of nutrition concepts with no prior nutrition education. NKFA. UBW reported 63.6 kg, with unintentional wt loss occurring over past 6 months d/t aforementioned reasons. This wt change is 20% in 6 month duration (severe). <75% energy intake >1 month reported. No supplements reported.

## 2019-12-13 NOTE — DIETITIAN INITIAL EVALUATION ADULT. - PERTINENT LABORATORY DATA
12/13: RBC-3.80, H/H-11.4/32.8, creat-0.5, gluc-135, POCT gluc-316 (16:40) vs. 211 (11:34) vs. 126 (8:06) vs. 280 (2:06); 12/12: WhkQ5V-9.5%

## 2019-12-13 NOTE — PROGRESS NOTE ADULT - ASSESSMENT
# Painless, obstructive jaundice: likely panc mass  need ERCP w/ stenting and bx  no need to follow LFTs until after stenting  f/u w/ [facility] for ERCP + Stent + EUS/FNA (prob Mon - NPO p MN Sun; IVFs on Mon)  f/u CEA, CA 19-9  no pain  PPI po for heartburn  will need h/o eval once dx confirmed    # markedly distended stomach - no vomiting  could have been from diabetes not controlled  pt seems OK now  try to feed pt - can d/c IVFs for now  can use reglan prn  if starts to vomit - would use NGT to decompress w/ intermit suction    # Hyperglycemia: likely from panc insuff from mass; A1c 8.5  fs qac/hs and keep btw 100-180 - adjust insulin as needed  cont lant 15 and lisp 5/meal w/ +1 Cf scale  AG improved from 16 to 12  diabetic diet    # DVT ppx: Lovenox - will hold for ERCP    # GI ppx: ppi    # full code    Dispo: tx hyperglycemia; f/u ability to feed re distended stomach; set up w/ GI for ERCP for stent and bx    Prog: seems like it will be poor

## 2019-12-13 NOTE — DIETITIAN INITIAL EVALUATION ADULT. - RD TO REMAIN AVAILABLE
yes/Nutrition Intervention: Meals & Snacks, Medical Food Supplements, Coordination of Care, Nutrition Related Medication Management. Monitor: Energy intake, diet order, glucose profile, electrolyte profile, nutrition focused physical findings, body composition.

## 2019-12-13 NOTE — DIETITIAN INITIAL EVALUATION ADULT. - ENERGY NEEDS
Energy: 6341-3016 kcal/day (MSJx1.3-1.5 AF) - range chosen d/t BMI borderline underweight, pt meets malnutrition criteria    Protein: 60-70 g/day (1.2-1.4 g/kg ABW) - as above    Fluids: 1 mL/kcal

## 2019-12-13 NOTE — CHART NOTE - NSCHARTNOTEFT_GEN_A_CORE
Upon Nutritional Assessment by the Registered Dietitian your patient was determined to meet criteria / has evidence of the following diagnosis/diagnoses:          [ ]  Mild Protein Calorie Malnutrition        [ ]  Moderate Protein Calorie Malnutrition        [x] Severe Protein Calorie Malnutrition        [ ] Unspecified Protein Calorie Malnutrition        [x] Underweight / BMI <19        [ ] Morbid Obesity / BMI > 40      Findings as based on:  •  Comprehensive nutrition assessment and consultation  •  Food acceptance and intake status from observations by staff    BMI <19 indicators:  Ht: 165.1 cm.  Wt: 50.7 kg  BMI 18.6    Severe protein-calorie malnutrition indicators:  (1) <75% energy intake >1 month  (2) 20% unintentional wt loss x6 months    Treatment:    The following diet has been recommended:  Recommendation: (1) Order Glucerna q12hrs. (2) Order Prosource 20 sugar free q24hrs. (3) Consult endocrinology services - pt with no noted PMH of DM at this time, HgbA1C of 8.5% noted. (4) If po intake remains poor, consider an appetite stimulant.    PROVIDER Section:     By signing this assessment you are acknowledging and agree with the diagnosis/diagnoses assigned by the Registered Dietitian    Comments:

## 2019-12-13 NOTE — DIETITIAN INITIAL EVALUATION ADULT. - ADD RECOMMEND
Recommendation: (1) Order Glucerna q12hrs. (2) Order Prosource 20 sugar free q24hrs. (3) Consult endocrinology services - pt with no noted PMH of DM at this time, HgbA1C of 8.5% noted. (4) If po intake remains poor, consider an appetite stimulant.

## 2019-12-13 NOTE — PROGRESS NOTE ADULT - ASSESSMENT
49 year old M with no known PMHx presents to the ED for evaluation of jaundice.    # Painless obstructive jaundice:  - cholestatic transaminitis with total bili 32.4 predominantly direct, , AST 66,   - CT abdomen is highly suspicious of pancreatic head mass with CBD dilation to 1.7, pancreatic duct dilation, intra and extra hepatic duct dilation  - will trend hepatic function, INR daily  -Abnormal CT ( PD dilation/ Cutoff at level of the Pancreatic Head, ascites versus infiltration)/  DKA  - Observe for upper GI symptoms , if emesis develops would place NG tube  - Correct DKA??  - Will ultimately benefit from EUS/ERCP when DKA improved- would ng decompress prior to any endoscopy to prevent aspiration of stomach contents  - MRI/ MRCP performed for now for ductal mapping  - CEA, Ca 19-9 ordered  - Pantoprazole 40mg daily  - Will follow     # Hyperglycemia:   - not on any medications, insulin regimen to control, monitor FS  - AG improved, HbA1C 8.5    DVT ppx: Lovenox  from home  full code  CHO consistent diet

## 2019-12-13 NOTE — PROGRESS NOTE ADULT - SUBJECTIVE AND OBJECTIVE BOX
ANDREI MAHMOOD  49y  Male  ***My note supersedes ALL resident notes that I sign.  My corrections for their notes are in my note.***    I can be reached directly on [phone]. My office number is [phone]. My personal cell number is [phone].    INTERVAL EVENTS: Here for f/u of panc mass. Pt is jaundiced. Has minor heartburn. Slight discomfort in RUQ, not req pain meds. Pt has no toxic exposure (worked as  and then [employer] ). No tob, alcohol or drug abuse. No famhx of cancer. Pt stable and awaiting procedures for Mon.    T(F): 97.2 (12-13-19 @ 12:28), Max: 98.1 (12-12-19 @ 20:02)  HR: 77 (12-13-19 @ 12:28) (76 - 85)  BP: 126/78 (12-13-19 @ 12:28) (104/62 - 126/78)  RR: 20 (12-13-19 @ 12:28) (18 - 20)  SpO2: 98% (12-12-19 @ 17:09) (98% - 98%)    Gen: deeply jaundiced; cachectic; seems comfortable  HEENT: scl yellow; PERRL; EOMI; mouth nl; nose clr  Neck: no nodes; no JVD; thyroid nl  lungs: clr  Hrt: s1 s2 rrr  abd: minor discomfort RUQ, no mass; no HS megaly; ND  ext: no edema, no c/c  neuro: WNL    LABS:                        11.4    (    86.3   8.38  )-----------( ---------      436      ( 13 Dec 2019 04:30 )             32.8    (    20.5     137   (   99   (   135      12-13-19 @ 04:30  ----------------------               3.9   (   25   (   11                             -----                        0.5  Ca  8.8   Mg  1.8    P   --     LFT  5.4  (  26.0  (  66       12-13-19 @ 04:30  -------------------------  3.2  (  359  (  101    T angie 26.0     AST 66    12-13-19 @ 04:30  T angie 28.0     AST 62    12-12-19 @ 07:24  T angie 32.4     AST 66    12-11-19 @ 16:30  T angie 31.7     AST 66    12-11-19 @ 16:22    PT/INR - ( 13 Dec 2019 04:30 )   PT: 11.10 sec;   INR: 0.96 ratio    PTT - ( 12 Dec 2019 07:24 )  PTT:29.5 sec    CAPILLARY BLOOD GLUCOSE  POCT Blood Glucose.: 211 (12-13-19 @ 11:34)  POCT Blood Glucose.: 126 (12-13-19 @ 08:06)  POCT Blood Glucose.: 280 (12-13-19 @ 02:06)  POCT Blood Glucose.: 402 (12-12-19 @ 23:26)  POCT Blood Glucose.: 309 (12-12-19 @ 21:18)  POCT Blood Glucose.: 296 (12-12-19 @ 17:50)  POCT Blood Glucose.: 342 (12-12-19 @ 11:23)  POCT Blood Glucose.: 221 (12-12-19 @ 08:21)    RADIOLOGY & ADDITIONAL TESTS:  < from: MR Abdomen No Cont (12.12.19 @ 23:15) >  IMPRESSION:    Limited evaluation without intravenous contrast.    Marked dilation of the intra and extrahepatic biliary ductal system as   well as the pancreatic duct.     2.3 cm diffusion abnormality seen in the pancreatic head region, just   distal to the common bile duct, which is nonspecific but could represent   the primary mass (11:59). Recommend ERCP.    No choledocholithiasis.    Markedly distended stomach.    Redemonstrated lingular nodule measuring 3 mm, indeterminate    < end of copied text >    < from: Xray Shoulder 2 Views, Bilateral (12.12.19 @ 13:02) >  IMPRESSION:    1. No acute osseous abnormality.  2. Nonspecific sclerosis in the right humeral head may be seen with early   avascular necrosis. No subchondral collapse.  3. Degenerative change as above.     < end of copied text >    < from: Xray Chest 1 View-PORTABLE IMMEDIATE (12.11.19 @ 22:14) >  Impression:      No radiographic evidence of acute cardiopulmonary disease.    < end of copied text >    < from: CT Abdomen and Pelvis w/ IV Cont (12.11.19 @ 19:18) >  IMPRESSION:     Severe pancreatic duct dilatation measuring 9 mm with distal pancreatic   atrophy.    Intrahepatic and extrahepatic biliary duct dilatation with the common   bile duct measuring up to 1.7 cm in the head of the pancreas.    Abrupt cut off of the pancreatic duct and common bile duct in the head of   pancreas, findings are highly suspicious for pancreatic head mass. A   discrete lesion is difficult to identify on this examination.    Infiltration of retroperitoneal fat around the aorta, IVC, and large   vessels off the aorta. Significant loss of retroperitoneal and mesenteric   fat. Infiltration may be related to ascites and edema. However, malignant   ascites or infiltration is not excluded.    Subcentimeter nodules in the left lung measuring up to 8 mm.    Severe loss of subcutaneous, retroperitoneal and mesenteric fat    Stomach is severely distended with material    < end of copied text >      MEDICATIONS:    enoxaparin Injectable 40 milliGRAM(s) SubCutaneous daily  insulin glargine Injectable (LANTUS) 15 Unit(s) SubCutaneous at bedtime  insulin lispro (HumaLOG) corrective regimen sliding scale   SubCutaneous three times a day before meals  insulin lispro Injectable (HumaLOG) 5 Unit(s) SubCutaneous three times a day before meals  pantoprazole    Tablet 40 milliGRAM(s) Oral daily  sodium chloride 0.9%. 1000 milliLiter(s) IV Continuous <Continuous>

## 2019-12-13 NOTE — DIETITIAN INITIAL EVALUATION ADULT. - DIET TYPE
DASH/TLC + Consistent Carbohydrate diet (evening snack). Poor appetite & po intake at this time, consuming <50% of meals.

## 2019-12-13 NOTE — DIETITIAN INITIAL EVALUATION ADULT. - PHYSICAL APPEARANCE
BMI: 18.6. Alert. Last BM 12/13. No chewing/swallowing difficulty reported at this time. Skin: Intact.

## 2019-12-14 LAB
ALBUMIN SERPL ELPH-MCNC: 3.4 G/DL — LOW (ref 3.5–5.2)
ALP SERPL-CCNC: 363 U/L — HIGH (ref 30–115)
ALT FLD-CCNC: 102 U/L — HIGH (ref 0–41)
ANION GAP SERPL CALC-SCNC: 17 MMOL/L — HIGH (ref 7–14)
AST SERPL-CCNC: 71 U/L — HIGH (ref 0–41)
BASOPHILS # BLD AUTO: 0.08 K/UL — SIGNIFICANT CHANGE UP (ref 0–0.2)
BASOPHILS NFR BLD AUTO: 0.8 % — SIGNIFICANT CHANGE UP (ref 0–1)
BILIRUB SERPL-MCNC: 28.1 MG/DL — CRITICAL HIGH (ref 0.2–1.2)
BUN SERPL-MCNC: 10 MG/DL — SIGNIFICANT CHANGE UP (ref 10–20)
CALCIUM SERPL-MCNC: 9.2 MG/DL — SIGNIFICANT CHANGE UP (ref 8.5–10.1)
CHLORIDE SERPL-SCNC: 96 MMOL/L — LOW (ref 98–110)
CO2 SERPL-SCNC: 26 MMOL/L — SIGNIFICANT CHANGE UP (ref 17–32)
CREAT SERPL-MCNC: 0.6 MG/DL — LOW (ref 0.7–1.5)
EOSINOPHIL # BLD AUTO: 0.04 K/UL — SIGNIFICANT CHANGE UP (ref 0–0.7)
EOSINOPHIL NFR BLD AUTO: 0.4 % — SIGNIFICANT CHANGE UP (ref 0–8)
GLUCOSE BLDC GLUCOMTR-MCNC: 152 MG/DL — HIGH (ref 70–99)
GLUCOSE BLDC GLUCOMTR-MCNC: 162 MG/DL — HIGH (ref 70–99)
GLUCOSE BLDC GLUCOMTR-MCNC: 279 MG/DL — HIGH (ref 70–99)
GLUCOSE BLDC GLUCOMTR-MCNC: 357 MG/DL — HIGH (ref 70–99)
GLUCOSE BLDC GLUCOMTR-MCNC: 363 MG/DL — HIGH (ref 70–99)
GLUCOSE BLDC GLUCOMTR-MCNC: 67 MG/DL — LOW (ref 70–99)
GLUCOSE SERPL-MCNC: 64 MG/DL — LOW (ref 70–99)
HCT VFR BLD CALC: 31.4 % — LOW (ref 42–52)
HGB BLD-MCNC: 10.9 G/DL — LOW (ref 14–18)
IMM GRANULOCYTES NFR BLD AUTO: 0.4 % — HIGH (ref 0.1–0.3)
INR BLD: 0.96 RATIO — SIGNIFICANT CHANGE UP (ref 0.65–1.3)
LYMPHOCYTES # BLD AUTO: 1.47 K/UL — SIGNIFICANT CHANGE UP (ref 1.2–3.4)
LYMPHOCYTES # BLD AUTO: 14.7 % — LOW (ref 20.5–51.1)
MCHC RBC-ENTMCNC: 30.3 PG — SIGNIFICANT CHANGE UP (ref 27–31)
MCHC RBC-ENTMCNC: 34.7 G/DL — SIGNIFICANT CHANGE UP (ref 32–37)
MCV RBC AUTO: 87.2 FL — SIGNIFICANT CHANGE UP (ref 80–94)
MONOCYTES # BLD AUTO: 0.97 K/UL — HIGH (ref 0.1–0.6)
MONOCYTES NFR BLD AUTO: 9.7 % — HIGH (ref 1.7–9.3)
NEUTROPHILS # BLD AUTO: 7.41 K/UL — HIGH (ref 1.4–6.5)
NEUTROPHILS NFR BLD AUTO: 74 % — SIGNIFICANT CHANGE UP (ref 42.2–75.2)
NRBC # BLD: 0 /100 WBCS — SIGNIFICANT CHANGE UP (ref 0–0)
PLATELET # BLD AUTO: 467 K/UL — HIGH (ref 130–400)
POTASSIUM SERPL-MCNC: 3.5 MMOL/L — SIGNIFICANT CHANGE UP (ref 3.5–5)
POTASSIUM SERPL-SCNC: 3.5 MMOL/L — SIGNIFICANT CHANGE UP (ref 3.5–5)
PROT SERPL-MCNC: 5.6 G/DL — LOW (ref 6–8)
PROTHROM AB SERPL-ACNC: 11.1 SEC — SIGNIFICANT CHANGE UP (ref 9.95–12.87)
RBC # BLD: 3.6 M/UL — LOW (ref 4.7–6.1)
RBC # FLD: 20.6 % — HIGH (ref 11.5–14.5)
SODIUM SERPL-SCNC: 139 MMOL/L — SIGNIFICANT CHANGE UP (ref 135–146)
WBC # BLD: 10.01 K/UL — SIGNIFICANT CHANGE UP (ref 4.8–10.8)
WBC # FLD AUTO: 10.01 K/UL — SIGNIFICANT CHANGE UP (ref 4.8–10.8)

## 2019-12-14 PROCEDURE — 99232 SBSQ HOSP IP/OBS MODERATE 35: CPT

## 2019-12-14 RX ORDER — INSULIN LISPRO 100/ML
3 VIAL (ML) SUBCUTANEOUS ONCE
Refills: 0 | Status: COMPLETED | OUTPATIENT
Start: 2019-12-14 | End: 2019-12-14

## 2019-12-14 RX ORDER — INSULIN GLARGINE 100 [IU]/ML
12 INJECTION, SOLUTION SUBCUTANEOUS AT BEDTIME
Refills: 0 | Status: DISCONTINUED | OUTPATIENT
Start: 2019-12-14 | End: 2019-12-16

## 2019-12-14 RX ADMIN — Medication 3 UNIT(S): at 23:44

## 2019-12-14 RX ADMIN — Medication 5 UNIT(S): at 18:14

## 2019-12-14 RX ADMIN — INSULIN GLARGINE 12 UNIT(S): 100 INJECTION, SOLUTION SUBCUTANEOUS at 21:59

## 2019-12-14 RX ADMIN — Medication 3 UNIT(S): at 22:28

## 2019-12-14 RX ADMIN — Medication 1: at 12:00

## 2019-12-14 RX ADMIN — PANTOPRAZOLE SODIUM 40 MILLIGRAM(S): 20 TABLET, DELAYED RELEASE ORAL at 12:49

## 2019-12-14 RX ADMIN — Medication 5 UNIT(S): at 12:48

## 2019-12-14 NOTE — PROGRESS NOTE ADULT - ASSESSMENT
49 year old M with no known PMHx presents to the ED for evaluation of jaundice.      Today is hospital day 3d. This morning he is resting comfortably in bed and reports no new issues or overnight events. Orthopedic was consulted for  bilateral shoulder stiffness. May consider MRI of R shoulder to better evaluate R shoulder sclerotic changes, however would defer this workup pending pancreatic issue.   Discussed this issue with patient. Patient is worried as he wants to get discharged before coming Thursday as he is required to be home for a social benefits verification and this is his last day to get it done.  help and clinical note if needed for absence was offered but he declined it as no show will cancel his benefits for sure. Patient promises to get his shoulder issue and possible MRI to be followed up outpatient and would like to get discharged after the stenting.       He is a very pleasant gentleman and expresses understanding but would like to leave after stenting because of his personal unavoidable circumstances.    # Painless obstructive jaundice:  - cholestatic transaminitis with total bili 32.4 predominantly direct, , AST 66,  on presentation  - CT abdomen is highly suspicious of pancreatic head mass with CBD dilation to 1.7, pancreatic duct dilation, intra and extra hepatic duct dilation  - will trend hepatic function, INR daily, deteriorating LFTs and bilirubin  -Abnormal CT ( PD dilation/ Cutoff at level of the Pancreatic Head, ascites versus infiltration)/  DKA  - Observe for upper GI symptoms , if emesis develops would place NG tube, kub NEGATIVE, no need for NG right now.   - MRI/ MRCP performed  for ductal mapping, stenting on MOnday, NPO after MN and fluids on Monday am.   - CEA 10.3, Ca 19-9 617  - Pantoprazole 40mg daily      # Hyperglycemia:   - not on any medications, insulin regimen to control, monitor FS  - AG improved, HbA1C 8.5  -12 laqntus, 5 lispro      DVT ppx: Lovenox  from home  full code  CHO consistent diet

## 2019-12-14 NOTE — PROGRESS NOTE ADULT - SUBJECTIVE AND OBJECTIVE BOX
SUBJECTIVE:    Patient is a 49y old Male who presents with a chief complaint of pancreatic mass (14 Dec 2019 10:12)    Currently admitted to medicine with the primary diagnosis of Pancreatic mass     Today is hospital day 3d. This morning he is resting comfortably in bed and reports no new issues or overnight events. Orthopedic was consulted for  bilateral shoulder stiffness. May consider MRI of R shoulder to better evaluate R shoulder sclerotic changes, however would defer this workup pending pancreatic issue.   Discussed this issue with patient. Patient is worried as he wants to get discharged before coming Thursday as he is required to be home for a social benefits verification and this is his last day to get it done.  help and clinical note if needed for absence was offered but he declined it as no show will cancel his benefits for sure. Patient promises to get his shoulder issue and possible MRI to be followed up outpatient and would like to get discharged after the stenting.         PAST MEDICAL & SURGICAL HISTORY  No pertinent past medical history  No significant past surgical history    SOCIAL HISTORY:    ALLERGIES:  No Known Allergies    MEDICATIONS:  STANDING MEDICATIONS  enoxaparin Injectable 40 milliGRAM(s) SubCutaneous daily  insulin glargine Injectable (LANTUS) 12 Unit(s) SubCutaneous at bedtime  insulin lispro (HumaLOG) corrective regimen sliding scale   SubCutaneous three times a day before meals  insulin lispro Injectable (HumaLOG) 5 Unit(s) SubCutaneous three times a day before meals  pantoprazole    Tablet 40 milliGRAM(s) Oral daily    PRN MEDICATIONS  glucagon  Injectable 1 milliGRAM(s) IntraMuscular once PRN    VITALS:   T(F): 97.7  HR: 71  BP: 104/61  RR: 20  SpO2: --    LABS:                        10.9   10.01 )-----------( 467      ( 14 Dec 2019 08:19 )             31.4     12-14    139  |  96<L>  |  10  ----------------------------<  64<L>  3.5   |  26  |  0.6<L>    Ca    9.2      14 Dec 2019 08:19  Mg     1.8     12-13    TPro  5.6<L>  /  Alb  3.4<L>  /  TBili  28.1<HH>  /  DBili  x   /  AST  71<H>  /  ALT  102<H>  /  AlkPhos  363<H>  12-14    PT/INR - ( 14 Dec 2019 08:19 )   PT: 11.10 sec;   INR: 0.96 ratio                   Culture - Blood (collected 11 Dec 2019 16:30)  Source: .Blood Blood  Preliminary Report (13 Dec 2019 01:01):    No growth to date.              RADIOLOGY:    PHYSICAL EXAM:  GENERAL: NAD, well-groomed, well-developed  HEAD:  NCAT  EYES: EOMI, PERRLA, conjunctiva clear  ENMT: No tonsillar erythema, exudates, or enlargement; Moist mucous membranes, Good dentition, No lesions  NECK: Supple, No JVD, Normal thyroid  NERVOUS SYSTEM: AAOX4, Good concentration; Motor Strength 5/5 B/L upper and lower extremities; DTRs 2+ intact and symmetric  CHEST/LUNG: CTA b/l no w/r/r  HEART: +s1s2 RRR no m/g/r  ABDOMEN: soft, NT/ND (+) bs, no HSM  EXTREMITIES:  2+ Peripheral Pulses, No c/c/e  LYMPH: No lymphadenopathy noted  SKIN: No rashes or lesions

## 2019-12-14 NOTE — CONSULT NOTE ADULT - SUBJECTIVE AND OBJECTIVE BOX
Orthopaedics Consult Note    ANDREI MAHMOOD  [id number]    Time consult called: 0800  Time patient seen: 0830    Patient is a 49y year old Male with several weeks of bilateral shoulder stiffness  He denies antecedent trauma or prior shoulder injury  Patient is currently admitted for evaluation of pancreatic mass with jaundice  XR showed possible early AVN changes of the right humeral head  Ortho is consulted for evaluation    PMH/PSH  PANCREATIC MASS  ^PANCREATIC MASS  Family history of diabetes mellitus (Father)  No pertinent family history in first degree relatives  Handoff  MEWS Score  No pertinent past medical history  Pancreatic mass  No significant past surgical history  JAUNDICE  Hyperglycemia  Jaundice      Medications  enoxaparin Injectable 40 milliGRAM(s) SubCutaneous daily  glucagon  Injectable 1 milliGRAM(s) IntraMuscular once PRN  insulin glargine Injectable (LANTUS) 12 Unit(s) SubCutaneous at bedtime  insulin lispro (HumaLOG) corrective regimen sliding scale   SubCutaneous three times a day before meals  insulin lispro Injectable (HumaLOG) 5 Unit(s) SubCutaneous three times a day before meals  pantoprazole    Tablet 40 milliGRAM(s) Oral daily      Allergies  No Known Allergies        T(C): 36.5 (12-14-19 @ 05:00), Max: 36.9 (12-13-19 @ 19:56)  HR: 71 (12-14-19 @ 05:00) (71 - 80)  BP: 104/61 (12-14-19 @ 05:00) (104/61 - 134/73)  RR: 20 (12-14-19 @ 05:00) (20 - 20)  SpO2: --    Physical Exam  NAD, AAOx3  Breathing comfortably on RA  Resting comfortably  Jaundice and cachexia noted  BL UE  Resitricted active and passive ROM noted  Abd 75  ER 45  IR belt  No deformity/laceration/abrasion noted  No swelling/erythema/ecchymosis noted  Motor: AIN/PIN/Ulnar intact  Sensory: Ax/M/R/U intact  Vasc: hand WWP, 2+ radial pulse    Labs                        10.9   10.01 )-----------( 467      ( 14 Dec 2019 08:19 )             31.4     12-14    139  |  96<L>  |  10  ----------------------------<  64<L>  3.5   |  26  |  0.6<L>    Ca    9.2      14 Dec 2019 08:19  Mg     1.8     12-13    TPro  5.6<L>  /  Alb  3.4<L>  /  TBili  28.1<HH>  /  DBili  x   /  AST  71<H>  /  ALT  102<H>  /  AlkPhos  363<H>  12-14    LIVER FUNCTIONS - ( 14 Dec 2019 08:19 )  Alb: 3.4 g/dL / Pro: 5.6 g/dL / ALK PHOS: 363 U/L / ALT: 102 U/L / AST: 71 U/L / GGT: x           PT/INR - ( 14 Dec 2019 08:19 )   PT: 11.10 sec;   INR: 0.96 ratio             Img  BL shoulder XR  Exam limited by lack of orthogonal view  No fracture  Nonspecific right humeral head sclerosis noted  No subchondral collapse    A/P: Patient is a 49y year old Male with bilateral shoulder stiffness    PT / OT consult for full weightbearing stretching and ROM exercises  NSAIDs as tolerated  Follow up pancreatic mass evaluation re: treatment plan and prognosis  May consider MRI of R shoulder to better evaluate R shoulder sclerotic changes, however would defer this workup pending pancreatic issue

## 2019-12-14 NOTE — PROGRESS NOTE ADULT - ATTENDING COMMENTS
pt seen and examined independently   c/o b/l shoulder stiffness over the past few months  ++icteric/jaundiced, thin, cachectic, abdomen soft and non-distended today    #painless jaundice/pancreatic mass/hyperbilirubinemia - likely malignant.  pending ERCP w/ stenting on Monday.  CA 19-9 and CEA elevated  #hyperglycemia/pancreatic insufficiency - continue insulin, avoid hypoglycemia.  A1c 8.5%.  will need insulin teaching   #b/l shoulder stiffness - XR showing early avascular necrosis.  ortho appreciated.  continue PT/OT.  OP w/u w/ MRI in the future after pancreatic issues worked out  #severe protein calorie malnutrition with BMI 18.6  #PPx - lovenox    Progress Note Handoff  Pending:  ERCP w/ stenting on Monday  Patient/Family discussion: discussed plan w/ pt  Disposition: home after ERCP/stent if stable

## 2019-12-14 NOTE — CONSULT NOTE ADULT - ATTENDING COMMENTS
Pt seen and examined.  Agree with resident exam and plan.  WBAT  PT/OT for ROM  f/u as outpatient with orthopedics at [address] [phone] with Dr. Leon or Janay
Patient seen and evaluated. Labs and available imaging studies reviewed .Agree with the above. Would hold off on procedure until DKA improved. Likely plan GI evaluation/ interventions Monday 12/16

## 2019-12-15 LAB
ANION GAP SERPL CALC-SCNC: 12 MMOL/L — SIGNIFICANT CHANGE UP (ref 7–14)
BASOPHILS # BLD AUTO: 0.05 K/UL — SIGNIFICANT CHANGE UP (ref 0–0.2)
BASOPHILS NFR BLD AUTO: 0.8 % — SIGNIFICANT CHANGE UP (ref 0–1)
BUN SERPL-MCNC: 11 MG/DL — SIGNIFICANT CHANGE UP (ref 10–20)
CALCIUM SERPL-MCNC: 8.5 MG/DL — SIGNIFICANT CHANGE UP (ref 8.5–10.1)
CHLORIDE SERPL-SCNC: 97 MMOL/L — LOW (ref 98–110)
CO2 SERPL-SCNC: 28 MMOL/L — SIGNIFICANT CHANGE UP (ref 17–32)
CREAT SERPL-MCNC: 0.6 MG/DL — LOW (ref 0.7–1.5)
EOSINOPHIL # BLD AUTO: 0.08 K/UL — SIGNIFICANT CHANGE UP (ref 0–0.7)
EOSINOPHIL NFR BLD AUTO: 1.2 % — SIGNIFICANT CHANGE UP (ref 0–8)
GLUCOSE BLDC GLUCOMTR-MCNC: 119 MG/DL — HIGH (ref 70–99)
GLUCOSE BLDC GLUCOMTR-MCNC: 203 MG/DL — HIGH (ref 70–99)
GLUCOSE BLDC GLUCOMTR-MCNC: 219 MG/DL — HIGH (ref 70–99)
GLUCOSE BLDC GLUCOMTR-MCNC: 230 MG/DL — HIGH (ref 70–99)
GLUCOSE BLDC GLUCOMTR-MCNC: 347 MG/DL — HIGH (ref 70–99)
GLUCOSE BLDC GLUCOMTR-MCNC: 350 MG/DL — HIGH (ref 70–99)
GLUCOSE BLDC GLUCOMTR-MCNC: 61 MG/DL — LOW (ref 70–99)
GLUCOSE SERPL-MCNC: 122 MG/DL — HIGH (ref 70–99)
HCT VFR BLD CALC: 28.1 % — LOW (ref 42–52)
HGB BLD-MCNC: 10.1 G/DL — LOW (ref 14–18)
IMM GRANULOCYTES NFR BLD AUTO: 0.6 % — HIGH (ref 0.1–0.3)
LYMPHOCYTES # BLD AUTO: 0.8 K/UL — LOW (ref 1.2–3.4)
LYMPHOCYTES # BLD AUTO: 12.3 % — LOW (ref 20.5–51.1)
MCHC RBC-ENTMCNC: 31 PG — SIGNIFICANT CHANGE UP (ref 27–31)
MCHC RBC-ENTMCNC: 35.9 G/DL — SIGNIFICANT CHANGE UP (ref 32–37)
MCV RBC AUTO: 86.2 FL — SIGNIFICANT CHANGE UP (ref 80–94)
MONOCYTES # BLD AUTO: 0.71 K/UL — HIGH (ref 0.1–0.6)
MONOCYTES NFR BLD AUTO: 10.9 % — HIGH (ref 1.7–9.3)
NEUTROPHILS # BLD AUTO: 4.85 K/UL — SIGNIFICANT CHANGE UP (ref 1.4–6.5)
NEUTROPHILS NFR BLD AUTO: 74.2 % — SIGNIFICANT CHANGE UP (ref 42.2–75.2)
NRBC # BLD: 0 /100 WBCS — SIGNIFICANT CHANGE UP (ref 0–0)
PLATELET # BLD AUTO: 374 K/UL — SIGNIFICANT CHANGE UP (ref 130–400)
POTASSIUM SERPL-MCNC: 3.5 MMOL/L — SIGNIFICANT CHANGE UP (ref 3.5–5)
POTASSIUM SERPL-SCNC: 3.5 MMOL/L — SIGNIFICANT CHANGE UP (ref 3.5–5)
RBC # BLD: 3.26 M/UL — LOW (ref 4.7–6.1)
RBC # FLD: 21.1 % — HIGH (ref 11.5–14.5)
SODIUM SERPL-SCNC: 137 MMOL/L — SIGNIFICANT CHANGE UP (ref 135–146)
WBC # BLD: 6.53 K/UL — SIGNIFICANT CHANGE UP (ref 4.8–10.8)
WBC # FLD AUTO: 6.53 K/UL — SIGNIFICANT CHANGE UP (ref 4.8–10.8)

## 2019-12-15 PROCEDURE — 99232 SBSQ HOSP IP/OBS MODERATE 35: CPT

## 2019-12-15 RX ORDER — INSULIN LISPRO 100/ML
6 VIAL (ML) SUBCUTANEOUS ONCE
Refills: 0 | Status: COMPLETED | OUTPATIENT
Start: 2019-12-15 | End: 2019-12-15

## 2019-12-15 RX ORDER — CHLORHEXIDINE GLUCONATE 213 G/1000ML
1 SOLUTION TOPICAL
Refills: 0 | Status: DISCONTINUED | OUTPATIENT
Start: 2019-12-15 | End: 2019-12-17

## 2019-12-15 RX ADMIN — PANTOPRAZOLE SODIUM 40 MILLIGRAM(S): 20 TABLET, DELAYED RELEASE ORAL at 13:13

## 2019-12-15 RX ADMIN — Medication 5 UNIT(S): at 08:52

## 2019-12-15 RX ADMIN — INSULIN GLARGINE 12 UNIT(S): 100 INJECTION, SOLUTION SUBCUTANEOUS at 21:43

## 2019-12-15 RX ADMIN — Medication 2: at 17:36

## 2019-12-15 RX ADMIN — Medication 6 UNIT(S): at 23:45

## 2019-12-15 NOTE — PROGRESS NOTE ADULT - ASSESSMENT
49M here for eval of jaundice and wt loss    #painless jaundice/pancreatic mass/hyperbilirubinemia - likely malignant.  pending ERCP w/ stenting on Monday.  CA 19-9 and CEA elevated   #hyperglycemia/pancreatic insufficiency - continue insulin, avoid hypoglycemia.  lantus 12U qHS, d/c pre-meal.  continue sliding scale.  A1c 8.5%.  will need insulin teaching   #b/l shoulder stiffness - XR showing early avascular necrosis.  ortho appreciated.  continue PT/OT.  OP w/u w/ MRI in the future after pancreatic issues worked out  #severe protein calorie malnutrition with BMI 18.6  #PPx - lovenox    Progress Note Handoff  Pending:  ERCP w/ stenting tomorrow  Patient/Family discussion: discussed plan w/ pt  Disposition: home after ERCP/stent if stable

## 2019-12-15 NOTE — PROGRESS NOTE ADULT - SUBJECTIVE AND OBJECTIVE BOX
ANDREI MAHMOOD  49y, Male  Allergy: No Known Allergies    Hospital Day: 4d    Patient seen and examined earlier today.  No complaints, doing about the same    PMH/PSH:  PAST MEDICAL & SURGICAL HISTORY:  No pertinent past medical history  No significant past surgical history    VITALS:  T(F): 96.5 (12-15-19 @ 13:39), Max: 97.9 (12-14-19 @ 19:36)  HR: 81 (12-15-19 @ 13:39)  BP: 127/89 (12-15-19 @ 13:39) (105/58 - 127/89)  RR: 18 (12-15-19 @ 13:39)  SpO2: 99% (12-14-19 @ 20:14)    PHYSICAL EXAM:  GENERAL: NAD, thin ++jaundice  HEENT: MMM ++icteric sclera  CVS:  RRR  CHEST/LUNG: Good air entry, no wheeze  ABD:  soft, NT/ND +bs  EXTREMITIES:  no edema  NEURO: AOx3    TESTS & MEASUREMENTS:                          10.1   6.53  )-----------( 374      ( 15 Dec 2019 08:07 )             28.1     PT/INR - ( 14 Dec 2019 08:19 )   PT: 11.10 sec;   INR: 0.96 ratio           12-15    137  |  97<L>  |  11  ----------------------------<  122<H>  3.5   |  28  |  0.6<L>    Ca    8.5      15 Dec 2019 08:07    TPro  5.6<L>  /  Alb  3.4<L>  /  TBili  28.1<HH>  /  DBili  x   /  AST  71<H>  /  ALT  102<H>  /  AlkPhos  363<H>  12-14    LIVER FUNCTIONS - ( 14 Dec 2019 08:19 )  Alb: 3.4 g/dL / Pro: 5.6 g/dL / ALK PHOS: 363 U/L / ALT: 102 U/L / AST: 71 U/L / GGT: x               Culture - Blood (collected 12-11-19 @ 16:30)  Source: .Blood Blood  Preliminary Report (12-13-19 @ 01:01):    No growth to date.      RADIOLOGY & ADDITIONAL TESTS:  < from: MR Abdomen No Cont (12.12.19 @ 23:15) >  IMPRESSION:    Limited evaluation without intravenous contrast.    Marked dilation of the intra and extrahepatic biliary ductal system as   well as the pancreatic duct.     2.3 cm diffusion abnormality seen in the pancreatic head region, just   distal to the common bile duct, which is nonspecific but could represent   the primary mass (11:59). Recommend ERCP.    No choledocholithiasis.    Markedly distended stomach.    Redemonstrated lingular nodule measuring 3 mm, indeterminate    < end of copied text >      MEDICATIONS:  MEDICATIONS  (STANDING):  chlorhexidine 4% Liquid 1 Application(s) Topical <User Schedule>  enoxaparin Injectable 40 milliGRAM(s) SubCutaneous daily  insulin glargine Injectable (LANTUS) 12 Unit(s) SubCutaneous at bedtime  insulin lispro (HumaLOG) corrective regimen sliding scale   SubCutaneous three times a day before meals  insulin lispro Injectable (HumaLOG) 5 Unit(s) SubCutaneous three times a day before meals  pantoprazole    Tablet 40 milliGRAM(s) Oral daily    MEDICATIONS  (PRN):  glucagon  Injectable 1 milliGRAM(s) IntraMuscular once PRN Glucose LESS THAN 70 milligrams/deciliter      HOME MEDICATIONS:

## 2019-12-16 ENCOUNTER — TRANSCRIPTION ENCOUNTER (OUTPATIENT)
Age: 49
End: 2019-12-16

## 2019-12-16 ENCOUNTER — RESULT REVIEW (OUTPATIENT)
Age: 49
End: 2019-12-16

## 2019-12-16 LAB
ALBUMIN SERPL ELPH-MCNC: 3.1 G/DL — LOW (ref 3.5–5.2)
ALP SERPL-CCNC: 348 U/L — HIGH (ref 30–115)
ALT FLD-CCNC: 85 U/L — HIGH (ref 0–41)
ANION GAP SERPL CALC-SCNC: 12 MMOL/L — SIGNIFICANT CHANGE UP (ref 7–14)
AST SERPL-CCNC: 57 U/L — HIGH (ref 0–41)
BASOPHILS # BLD AUTO: 0.05 K/UL — SIGNIFICANT CHANGE UP (ref 0–0.2)
BASOPHILS NFR BLD AUTO: 0.7 % — SIGNIFICANT CHANGE UP (ref 0–1)
BILIRUB SERPL-MCNC: 23.7 MG/DL — CRITICAL HIGH (ref 0.2–1.2)
BUN SERPL-MCNC: 11 MG/DL — SIGNIFICANT CHANGE UP (ref 10–20)
CALCIUM SERPL-MCNC: 8.9 MG/DL — SIGNIFICANT CHANGE UP (ref 8.5–10.1)
CHLORIDE SERPL-SCNC: 100 MMOL/L — SIGNIFICANT CHANGE UP (ref 98–110)
CO2 SERPL-SCNC: 27 MMOL/L — SIGNIFICANT CHANGE UP (ref 17–32)
CREAT SERPL-MCNC: 0.6 MG/DL — LOW (ref 0.7–1.5)
EOSINOPHIL # BLD AUTO: 0.11 K/UL — SIGNIFICANT CHANGE UP (ref 0–0.7)
EOSINOPHIL NFR BLD AUTO: 1.4 % — SIGNIFICANT CHANGE UP (ref 0–8)
GLUCOSE BLDC GLUCOMTR-MCNC: 103 MG/DL — HIGH (ref 70–99)
GLUCOSE BLDC GLUCOMTR-MCNC: 191 MG/DL — HIGH (ref 70–99)
GLUCOSE BLDC GLUCOMTR-MCNC: 78 MG/DL — SIGNIFICANT CHANGE UP (ref 70–99)
GLUCOSE BLDC GLUCOMTR-MCNC: 83 MG/DL — SIGNIFICANT CHANGE UP (ref 70–99)
GLUCOSE BLDC GLUCOMTR-MCNC: 97 MG/DL — SIGNIFICANT CHANGE UP (ref 70–99)
GLUCOSE SERPL-MCNC: 81 MG/DL — SIGNIFICANT CHANGE UP (ref 70–99)
HCT VFR BLD CALC: 29.3 % — LOW (ref 42–52)
HGB BLD-MCNC: 10.2 G/DL — LOW (ref 14–18)
IMM GRANULOCYTES NFR BLD AUTO: 0.7 % — HIGH (ref 0.1–0.3)
INR BLD: 0.96 RATIO — SIGNIFICANT CHANGE UP (ref 0.65–1.3)
LYMPHOCYTES # BLD AUTO: 1.26 K/UL — SIGNIFICANT CHANGE UP (ref 1.2–3.4)
LYMPHOCYTES # BLD AUTO: 16.5 % — LOW (ref 20.5–51.1)
MCHC RBC-ENTMCNC: 30.3 PG — SIGNIFICANT CHANGE UP (ref 27–31)
MCHC RBC-ENTMCNC: 34.8 G/DL — SIGNIFICANT CHANGE UP (ref 32–37)
MCV RBC AUTO: 86.9 FL — SIGNIFICANT CHANGE UP (ref 80–94)
MONOCYTES # BLD AUTO: 0.95 K/UL — HIGH (ref 0.1–0.6)
MONOCYTES NFR BLD AUTO: 12.5 % — HIGH (ref 1.7–9.3)
NEUTROPHILS # BLD AUTO: 5.21 K/UL — SIGNIFICANT CHANGE UP (ref 1.4–6.5)
NEUTROPHILS NFR BLD AUTO: 68.2 % — SIGNIFICANT CHANGE UP (ref 42.2–75.2)
NRBC # BLD: 0 /100 WBCS — SIGNIFICANT CHANGE UP (ref 0–0)
PLATELET # BLD AUTO: 390 K/UL — SIGNIFICANT CHANGE UP (ref 130–400)
POTASSIUM SERPL-MCNC: 3.9 MMOL/L — SIGNIFICANT CHANGE UP (ref 3.5–5)
POTASSIUM SERPL-SCNC: 3.9 MMOL/L — SIGNIFICANT CHANGE UP (ref 3.5–5)
PROT SERPL-MCNC: 5.3 G/DL — LOW (ref 6–8)
PROTHROM AB SERPL-ACNC: 11 SEC — SIGNIFICANT CHANGE UP (ref 9.95–12.87)
RBC # BLD: 3.37 M/UL — LOW (ref 4.7–6.1)
RBC # FLD: 21.8 % — HIGH (ref 11.5–14.5)
SODIUM SERPL-SCNC: 139 MMOL/L — SIGNIFICANT CHANGE UP (ref 135–146)
WBC # BLD: 7.63 K/UL — SIGNIFICANT CHANGE UP (ref 4.8–10.8)
WBC # FLD AUTO: 7.63 K/UL — SIGNIFICANT CHANGE UP (ref 4.8–10.8)

## 2019-12-16 PROCEDURE — 43274 ERCP DUCT STENT PLACEMENT: CPT

## 2019-12-16 PROCEDURE — 88172 CYTP DX EVAL FNA 1ST EA SITE: CPT | Mod: 26

## 2019-12-16 PROCEDURE — 88312 SPECIAL STAINS GROUP 1: CPT | Mod: 26

## 2019-12-16 PROCEDURE — 99232 SBSQ HOSP IP/OBS MODERATE 35: CPT

## 2019-12-16 PROCEDURE — 43239 EGD BIOPSY SINGLE/MULTIPLE: CPT | Mod: XU

## 2019-12-16 PROCEDURE — 43242 EGD US FINE NEEDLE BX/ASPIR: CPT | Mod: XU

## 2019-12-16 PROCEDURE — 43262 ENDO CHOLANGIOPANCREATOGRAPH: CPT | Mod: XU

## 2019-12-16 PROCEDURE — 88173 CYTOPATH EVAL FNA REPORT: CPT | Mod: 26

## 2019-12-16 PROCEDURE — 88305 TISSUE EXAM BY PATHOLOGIST: CPT | Mod: 26

## 2019-12-16 RX ORDER — CIPROFLOXACIN LACTATE 400MG/40ML
400 VIAL (ML) INTRAVENOUS EVERY 12 HOURS
Refills: 0 | Status: DISCONTINUED | OUTPATIENT
Start: 2019-12-17 | End: 2019-12-17

## 2019-12-16 RX ORDER — INSULIN LISPRO 100/ML
5 VIAL (ML) SUBCUTANEOUS
Refills: 0 | Status: DISCONTINUED | OUTPATIENT
Start: 2019-12-16 | End: 2019-12-17

## 2019-12-16 RX ORDER — ACETAMINOPHEN 500 MG
650 TABLET ORAL ONCE
Refills: 0 | Status: DISCONTINUED | OUTPATIENT
Start: 2019-12-16 | End: 2019-12-17

## 2019-12-16 RX ORDER — INSULIN GLARGINE 100 [IU]/ML
15 INJECTION, SOLUTION SUBCUTANEOUS AT BEDTIME
Refills: 0 | Status: DISCONTINUED | OUTPATIENT
Start: 2019-12-16 | End: 2019-12-17

## 2019-12-16 RX ORDER — CIPROFLOXACIN LACTATE 400MG/40ML
400 VIAL (ML) INTRAVENOUS ONCE
Refills: 0 | Status: COMPLETED | OUTPATIENT
Start: 2019-12-16 | End: 2019-12-16

## 2019-12-16 RX ORDER — CIPROFLOXACIN LACTATE 400MG/40ML
VIAL (ML) INTRAVENOUS
Refills: 0 | Status: DISCONTINUED | OUTPATIENT
Start: 2019-12-16 | End: 2019-12-17

## 2019-12-16 RX ORDER — SODIUM CHLORIDE 9 MG/ML
1000 INJECTION INTRAMUSCULAR; INTRAVENOUS; SUBCUTANEOUS
Refills: 0 | Status: DISCONTINUED | OUTPATIENT
Start: 2019-12-16 | End: 2019-12-16

## 2019-12-16 RX ORDER — INSULIN LISPRO 100/ML
5 VIAL (ML) SUBCUTANEOUS
Refills: 0 | Status: DISCONTINUED | OUTPATIENT
Start: 2019-12-16 | End: 2019-12-16

## 2019-12-16 RX ADMIN — Medication 200 MILLIGRAM(S): at 20:26

## 2019-12-16 RX ADMIN — SODIUM CHLORIDE 75 MILLILITER(S): 9 INJECTION INTRAMUSCULAR; INTRAVENOUS; SUBCUTANEOUS at 08:37

## 2019-12-16 NOTE — DISCHARGE NOTE PROVIDER - CARE PROVIDER_API CALL
[facility] clinic,   [address] [place] [address]  Phone: [phone]  Fax: (   )    -  Scheduled Appointment: 12/19/2019 08:30 AM [facility] clinic,   [address] [facility]  Phone: [phone]  Fax: (   )    -  Scheduled Appointment: 12/19/2019 08:30 AM    Scott Garcia ()  Hematology; Internal Medicine; Medical Oncology  [address] [place] [address]  Phone: [phone]  Fax: [phone]  Scheduled Appointment: 12/24/2019 01:00 PM Scott Garcia (DO)  Hematology; Internal Medicine; Medical Oncology  [address] [place] [address]  Phone: [phone]  Fax: [phone]  Scheduled Appointment: 12/24/2019 01:00 PM    Isrrael Barrera (DO)  Medicine  Physicians  [address] [place] [address]  Phone: [phone]  Fax: [phone]  Scheduled Appointment: 12/20/2019 08:30 AM

## 2019-12-16 NOTE — PROGRESS NOTE ADULT - SUBJECTIVE AND OBJECTIVE BOX
ANDREI MAHMOOD  49y  Male  ***My note supersedes ALL resident notes that I sign.  My corrections for their notes are in my note.***    I can be reached directly on [employer]. My office number is [phone]. My personal cell number is [phone].    INTERVAL EVENTS: Here for f/u of panc mass. PT had ERCP w/ bx and stenting. Pt has no pain. Feels hungry. Feels he can likely go home tomorrow.    T(F): 97.7 (12-16-19 @ 11:35), Max: 98.4 (12-15-19 @ 19:40)  HR: 70 (12-16-19 @ 16:38) (66 - 95)  BP: 117/66 (12-16-19 @ 16:38) (104/57 - 126/65)  RR: 118 (12-16-19 @ 16:38) (16 - 118)  SpO2: 100% (12-16-19 @ 16:23) (100% - 100%)    Gen: NAD; + jaundice  HEENT: scl yellow; PERRL; EOMI; mouth clr  Neck: WNL  lungs: clr  hrt: s1 s2 rrr  abd soft, NT/ND  ext no edema, no c/c  neuro: WNL    LABS:                        10.2    (    86.9   7.63  )-----------( ---------      390      ( 16 Dec 2019 07:27 )             29.3    (    21.8     139   (   100   (   81      12-16-19 @ 07:27  ----------------------               3.9   (   27   (   11                             -----                        0.6  Ca  8.9   Mg  --    P   --     LFT  5.3  (  23.7  (  57       12-16-19 @ 07:27  -------------------------  3.1  (  348  (  85    T angie 23.7     AST 57  ALT 85  12-16-19 @ 07:27  T angie 28.1     AST 71    12-14-19 @ 08:19  T angie 26.0     AST 66    12-13-19 @ 04:30  T angie 28.0     AST 62    12-12-19 @ 07:24    PT/INR - ( 16 Dec 2019 07:27 )   PT: 11.00 sec;   INR: 0.96 ratio      RADIOLOGY & ADDITIONAL TESTS:      MEDICATIONS:    chlorhexidine 4% Liquid 1 Application(s) Topical <User Schedule>  enoxaparin Injectable 40 milliGRAM(s) SubCutaneous daily  glucagon  Injectable 1 milliGRAM(s) IntraMuscular once PRN  insulin glargine Injectable (LANTUS) 12 Unit(s) SubCutaneous at bedtime  insulin lispro (HumaLOG) corrective regimen sliding scale   SubCutaneous three times a day before meals  pantoprazole    Tablet 40 milliGRAM(s) Oral daily  sodium chloride 0.9%. 1000 milliLiter(s) IV Continuous <Continuous>

## 2019-12-16 NOTE — CHART NOTE - NSCHARTNOTEFT_GEN_A_CORE
PACU ANESTHESIA ADMISSION NOTE      Procedure: ERCP with stent, EUS, FNA  Post op diagnosis:  pancreatic ca    ____  Intubated  TV:______       Rate: ______      FiO2: ______    _x___  Patent Airway    _x___  Full return of protective reflexes    _x___  Full recovery from anesthesia / back to baseline status    Vitals:            T:     nA           BP :   116/78             R:  18            Sat:  100             P:70      Mental Status:  _x___ Awake   _____ Alert   _____ Drowsy   _____ Sedated    Nausea/Vomiting:  _x___  NO       ______Yes,   See Post - Op Orders         Pain Scale (0-10):  __0___    Treatment: _x___ None    ____ See Post - Op/PCA Orders    Post - Operative Fluids:   __x__ Oral   ____ See Post - Op Orders    Plan: Discharge:   ___Home       __X___Floor     _____Critical Care    _____  Other:_________________    Comments:  No anesthesia issues or complications noted.  Discharge when criteria met.

## 2019-12-16 NOTE — DISCHARGE NOTE PROVIDER - NSDCCPCAREPLAN_GEN_ALL_CORE_FT
PRINCIPAL DISCHARGE DIAGNOSIS  Diagnosis: Pancreatic mass  Assessment and Plan of Treatment: You have a pancreatic mass which was obstructing your bile duct, causing backup of bilirubin and jaundice. You had an ERCP and stent placed in your bile duct, releiveing the obstruction. You are being prescribed ciprofloxacin, an antibiotic. Please take it as prescribed and follow up with the oncologist.      SECONDARY DISCHARGE DIAGNOSES  Diagnosis: Hyperglycemia  Assessment and Plan of Treatment: Please take glimeperide aas prescribed.    Diagnosis: Jaundice  Assessment and Plan of Treatment:

## 2019-12-16 NOTE — DISCHARGE NOTE PROVIDER - NSDCFUSCHEDAPPT_GEN_ALL_CORE_FT
ANDREI MAHMOOD ; 02/12/2020 ; [facility] [address] ANDREI MAHMOOD ; 12/20/2019 ; [facility] [address] ; 12/24/2019 ; [facility] [address] ANDREI Torres ; 02/12/2020 ; [facility] [address]

## 2019-12-16 NOTE — DISCHARGE NOTE PROVIDER - CARE PROVIDERS DIRECT ADDRESSES
,DirectAddress_Unknown ,DirectAddress_Unknown,[email or web address] ,[email or web address]

## 2019-12-16 NOTE — DISCHARGE NOTE PROVIDER - PROVIDER TOKENS
FREE:[LAST:[[facility] clinic],PHONE:[phone]],FAX:[(   [phone]    -],ADDRESS:[[address] [place] [address]],SCHEDULEDAPPT:[12/19/2019],SCHEDULEDAPPTTIME:[08:30 AM]] FREE:[LAST:[[facility]],PHONE:[phone]:[(   )    -],ADDRESS:[[address] [place] [address]],SCHEDULEDAPPT:[12/19/2019],SCHEDULEDAPPTTIME:[08:30 AM]],PROVIDER:[TOKEN:[[id number]],SCHEDULEDAPPT:[12/24/2019],SCHEDULEDAPPTTIME:[01:00 PM]] PROVIDER:[TOKEN:[[id number]],SCHEDULEDAPPT:[12/24/2019],SCHEDULEDAPPTTIME:[01:00 PM]],PROVIDER:[TOKEN:[[id number]],SCHEDULEDAPPT:[12/20/2019],SCHEDULEDAPPTTIME:[08:30 AM]]

## 2019-12-16 NOTE — DISCHARGE NOTE PROVIDER - NSDCMRMEDTOKEN_GEN_ALL_CORE_FT
ciprofloxacin 500 mg oral tablet: 1 tab(s) orally 2 times a day   glimepiride 1 mg oral tablet: 1 tab(s) orally once a day in the morning  pantoprazole 40 mg oral delayed release tablet: 1 tab(s) orally once a day alcohol swabs : Apply topically to affected area 4 times a day   ciprofloxacin 500 mg oral tablet: 1 tab(s) orally 2 times a day   glimepiride 1 mg oral tablet: 1 tab(s) orally once a day in the morning  glucometer (per patient&#x27;s insurance): Test blood sugars four times a day. Dispense #1 glucometer.  lancets: 1 application subcutaneously 4 times a day   pantoprazole 40 mg oral delayed release tablet: 1 tab(s) orally once a day  test strips (per patient&#x27;s insurance): 1 application subcutaneously 4 times a day. ** Compatible with patient&#x27;s glucometer **

## 2019-12-16 NOTE — PROGRESS NOTE ADULT - SUBJECTIVE AND OBJECTIVE BOX
Hospital Day:  5d    Subjective:    Patient is a 49y old  Male who presents with a chief complaint of pancreatic mass (15 Dec 2019 13:43)  This morning he is resting comfortably in bed and reports no new issues or overnight events. He reports he is still feeling weak but denies any new complaints.    Past Medical Hx:   No pertinent past medical history    Past Sx:  No significant past surgical history    Allergies:  No Known Allergies    Current Meds:   Standng Meds:  chlorhexidine 4% Liquid 1 Application(s) Topical <User Schedule>  enoxaparin Injectable 40 milliGRAM(s) SubCutaneous daily  insulin glargine Injectable (LANTUS) 12 Unit(s) SubCutaneous at bedtime  insulin lispro (HumaLOG) corrective regimen sliding scale   SubCutaneous three times a day before meals  pantoprazole    Tablet 40 milliGRAM(s) Oral daily  sodium chloride 0.9%. 1000 milliLiter(s) (75 mL/Hr) IV Continuous <Continuous>    PRN Meds:  glucagon  Injectable 1 milliGRAM(s) IntraMuscular once PRN Glucose LESS THAN 70 milligrams/deciliter    HOME MEDICATIONS:      Vital Signs:   T(F): 97.7 (12-16-19 @ 11:35), Max: 98.4 (12-15-19 @ 19:40)  HR: 80 (12-16-19 @ 11:46) (67 - 95)  BP: 116/81 (12-16-19 @ 11:46) (104/57 - 116/81)  RR: 18 (12-16-19 @ 11:46) (18 - 18)  SpO2: 100% (12-16-19 @ 11:46) (100% - 100%)        Physical Exam:   GENERAL: NAD  HEENT: NCAT  CHEST/LUNG: CTAB  HEART: Regular rate and rhythm; s1 s2 appreciated, No murmurs, rubs, or gallops  ABDOMEN: Soft, Nontender, Nondistended; Bowel sounds present  EXTREMITIES: No LE edema b/l  NERVOUS SYSTEM:  Alert & Oriented X3        Labs:                         10.2   7.63  )-----------( 390      ( 16 Dec 2019 07:27 )             29.3     Neutophil% 68.2, Lymphocyte% 16.5, Monocyte% 12.5, Bands% 0.7 12-16-19 @ 07:27    16 Dec 2019 07:27    139    |  100    |  11     ----------------------------<  81     3.9     |  27     |  0.6      Ca    8.9        16 Dec 2019 07:27    TPro  5.3    /  Alb  3.1    /  TBili  23.7   /  DBili  x      /  AST  57     /  ALT  85     /  AlkPhos  348    16 Dec 2019 07:27       pTT    --             ----< 0.96 INR  (12-16-19 @ 07:27)    11.00        PT    Amylase --, Lipase 4, 12-11-19 @ 16:22    Hemoglobin A1C, Whole Blood: 8.5 % (12-12-19 @ 07:24)    Culture - Blood (collected 12-11-19 @ 16:30)  Source: .Blood Blood  Preliminary Report (12-13-19 @ 01:01):    No growth to date.    Assessment and Plan:     49 year old M with no known PMHx presents to the ED for evaluation of jaundice.    Patient is worried as he wants to get discharged before coming Thursday as he is required to be home for a social benefits verification and this is his last day to get it done.  help and clinical note if needed for absence was offered but he declined it as no show will cancel his benefits for sure. Patient promises to get his shoulder issue and possible MRI to be followed up outpatient and would like to get discharged after the stenting.     # Painless obstructive jaundice:  - cholestatic transaminitis with total bili 23.7 predominantly direct with transaminitis  - CT abdomen is highly suspicious of pancreatic head mass with CBD dilation to 1.7, pancreatic duct dilation, intra and extra hepatic duct dilation  - Observe for upper GI symptoms , if emesis develops would place NG tube, kub NEGATIVE, no need for NG right now.   - MRI/ MRCP performed  for ductal mapping, stenting on 12/16.  - CEA 10.3, Ca 19-9 617  - Pantoprazole 40mg daily    # Hyperglycemia:   - not on any medications, insulin regimen to control, monitor FS  - AG improved, HbA1C 8.5  -12 lantus with sliding scale. D/c pre-meal.    #b/l shoulder stiffness   - XR showing early avascular necrosis.    - ortho recs appreciated:  OP w/u w/ MRI in the future after pancreatic issues worked out    DVT ppx: Lovenox  from home  full code  CHO consistent diet Hospital Day:  5d    Subjective:    Patient is a 49y old  Male who presents with a chief complaint of pancreatic mass (15 Dec 2019 13:43)  This morning he is resting comfortably in bed and reports no new issues or overnight events. He reports he is still feeling weak but denies any new complaints.    Past Medical Hx:   No pertinent past medical history    Past Sx:  No significant past surgical history    Allergies:  No Known Allergies    Current Meds:   Standng Meds:  chlorhexidine 4% Liquid 1 Application(s) Topical <User Schedule>  enoxaparin Injectable 40 milliGRAM(s) SubCutaneous daily  insulin glargine Injectable (LANTUS) 12 Unit(s) SubCutaneous at bedtime  insulin lispro (HumaLOG) corrective regimen sliding scale   SubCutaneous three times a day before meals  pantoprazole    Tablet 40 milliGRAM(s) Oral daily  sodium chloride 0.9%. 1000 milliLiter(s) (75 mL/Hr) IV Continuous <Continuous>    PRN Meds:  glucagon  Injectable 1 milliGRAM(s) IntraMuscular once PRN Glucose LESS THAN 70 milligrams/deciliter    HOME MEDICATIONS:      Vital Signs:   T(F): 97.7 (12-16-19 @ 11:35), Max: 98.4 (12-15-19 @ 19:40)  HR: 80 (12-16-19 @ 11:46) (67 - 95)  BP: 116/81 (12-16-19 @ 11:46) (104/57 - 116/81)  RR: 18 (12-16-19 @ 11:46) (18 - 18)  SpO2: 100% (12-16-19 @ 11:46) (100% - 100%)        Physical Exam:   GENERAL: NAD  HEENT: NCAT  CHEST/LUNG: CTAB  HEART: Regular rate and rhythm; s1 s2 appreciated, No murmurs, rubs, or gallops  ABDOMEN: Soft, Nontender, Nondistended; Bowel sounds present  EXTREMITIES: No LE edema b/l  NERVOUS SYSTEM:  Alert & Oriented X3        Labs:                         10.2   7.63  )-----------( 390      ( 16 Dec 2019 07:27 )             29.3     Neutophil% 68.2, Lymphocyte% 16.5, Monocyte% 12.5, Bands% 0.7 12-16-19 @ 07:27    16 Dec 2019 07:27    139    |  100    |  11     ----------------------------<  81     3.9     |  27     |  0.6      Ca    8.9        16 Dec 2019 07:27    TPro  5.3    /  Alb  3.1    /  TBili  23.7   /  DBili  x      /  AST  57     /  ALT  85     /  AlkPhos  348    16 Dec 2019 07:27       pTT    --             ----< 0.96 INR  (12-16-19 @ 07:27)    11.00        PT    Amylase --, Lipase 4, 12-11-19 @ 16:22    Hemoglobin A1C, Whole Blood: 8.5 % (12-12-19 @ 07:24)    Culture - Blood (collected 12-11-19 @ 16:30)  Source: .Blood Blood  Preliminary Report (12-13-19 @ 01:01):    No growth to date.    Assessment and Plan:     49 year old M with no known PMHx presents to the ED for evaluation of jaundice.    Patient is worried as he wants to get discharged before coming Thursday as he is required to be home for a social benefits verification and this is his last day to get it done.  help and clinical note if needed for absence was offered but he declined it as no show will cancel his benefits for sure. Patient promises to get his shoulder issue and possible MRI to be followed up outpatient and would like to get discharged after the stenting.     # Painless obstructive jaundice:  - cholestatic transaminitis with total bili 23.7 predominantly direct with transaminitis  - CT abdomen is highly suspicious of pancreatic head mass with CBD dilation to 1.7, pancreatic duct dilation, intra and extra hepatic duct dilation  - Observe for upper GI symptoms , if emesis develops would place NG tube, kub NEGATIVE, no need for NG right now.   - MRI/ MRCP performed  for ductal mapping, stenting on 12/16.  - CEA 10.3, Ca 19-9 617  - Pantoprazole 40mg daily    # Hyperglycemia:   - not on any medications, insulin regimen to control, monitor FS  - AG improved, HbA1C 8.5  -12 lantus with sliding scale.    #b/l shoulder stiffness   - XR showing early avascular necrosis.    - ortho recs appreciated:  OP w/u w/ MRI in the future after pancreatic issues worked out    DVT ppx: Lovenox  from home  full code  CHO consistent diet

## 2019-12-17 ENCOUNTER — TRANSCRIPTION ENCOUNTER (OUTPATIENT)
Age: 49
End: 2019-12-17

## 2019-12-17 VITALS
TEMPERATURE: 98 F | RESPIRATION RATE: 18 BRPM | HEART RATE: 90 BPM | DIASTOLIC BLOOD PRESSURE: 64 MMHG | SYSTOLIC BLOOD PRESSURE: 104 MMHG

## 2019-12-17 LAB
ALBUMIN SERPL ELPH-MCNC: 2.9 G/DL — LOW (ref 3.5–5.2)
ALP SERPL-CCNC: 348 U/L — HIGH (ref 30–115)
ALT FLD-CCNC: 80 U/L — HIGH (ref 0–41)
ANION GAP SERPL CALC-SCNC: 18 MMOL/L — HIGH (ref 7–14)
AST SERPL-CCNC: 68 U/L — HIGH (ref 0–41)
BASOPHILS # BLD AUTO: 0.02 K/UL — SIGNIFICANT CHANGE UP (ref 0–0.2)
BASOPHILS NFR BLD AUTO: 0.3 % — SIGNIFICANT CHANGE UP (ref 0–1)
BILIRUB DIRECT SERPL-MCNC: 17 MG/DL — HIGH (ref 0–0.2)
BILIRUB INDIRECT FLD-MCNC: 5.4 MG/DL — HIGH (ref 0.2–1.2)
BILIRUB SERPL-MCNC: 22.4 MG/DL — CRITICAL HIGH (ref 0.2–1.2)
BUN SERPL-MCNC: 12 MG/DL — SIGNIFICANT CHANGE UP (ref 10–20)
CALCIUM SERPL-MCNC: 8.7 MG/DL — SIGNIFICANT CHANGE UP (ref 8.5–10.1)
CHLORIDE SERPL-SCNC: 93 MMOL/L — LOW (ref 98–110)
CO2 SERPL-SCNC: 23 MMOL/L — SIGNIFICANT CHANGE UP (ref 17–32)
CREAT SERPL-MCNC: 0.6 MG/DL — LOW (ref 0.7–1.5)
CULTURE RESULTS: SIGNIFICANT CHANGE UP
EOSINOPHIL # BLD AUTO: 0.01 K/UL — SIGNIFICANT CHANGE UP (ref 0–0.7)
EOSINOPHIL NFR BLD AUTO: 0.1 % — SIGNIFICANT CHANGE UP (ref 0–8)
GLUCOSE BLDC GLUCOMTR-MCNC: 127 MG/DL — HIGH (ref 70–99)
GLUCOSE BLDC GLUCOMTR-MCNC: 207 MG/DL — HIGH (ref 70–99)
GLUCOSE SERPL-MCNC: 138 MG/DL — HIGH (ref 70–99)
HCT VFR BLD CALC: 30.3 % — LOW (ref 42–52)
HGB BLD-MCNC: 10.4 G/DL — LOW (ref 14–18)
IMM GRANULOCYTES NFR BLD AUTO: 0.5 % — HIGH (ref 0.1–0.3)
LYMPHOCYTES # BLD AUTO: 0.56 K/UL — LOW (ref 1.2–3.4)
LYMPHOCYTES # BLD AUTO: 7.1 % — LOW (ref 20.5–51.1)
MCHC RBC-ENTMCNC: 30.1 PG — SIGNIFICANT CHANGE UP (ref 27–31)
MCHC RBC-ENTMCNC: 34.3 G/DL — SIGNIFICANT CHANGE UP (ref 32–37)
MCV RBC AUTO: 87.6 FL — SIGNIFICANT CHANGE UP (ref 80–94)
MONOCYTES # BLD AUTO: 0.45 K/UL — SIGNIFICANT CHANGE UP (ref 0.1–0.6)
MONOCYTES NFR BLD AUTO: 5.7 % — SIGNIFICANT CHANGE UP (ref 1.7–9.3)
NEUTROPHILS # BLD AUTO: 6.76 K/UL — HIGH (ref 1.4–6.5)
NEUTROPHILS NFR BLD AUTO: 86.3 % — HIGH (ref 42.2–75.2)
NRBC # BLD: 0 /100 WBCS — SIGNIFICANT CHANGE UP (ref 0–0)
PLATELET # BLD AUTO: 409 K/UL — HIGH (ref 130–400)
POTASSIUM SERPL-MCNC: 3.5 MMOL/L — SIGNIFICANT CHANGE UP (ref 3.5–5)
POTASSIUM SERPL-SCNC: 3.5 MMOL/L — SIGNIFICANT CHANGE UP (ref 3.5–5)
PROT SERPL-MCNC: 5.1 G/DL — LOW (ref 6–8)
RBC # BLD: 3.46 M/UL — LOW (ref 4.7–6.1)
RBC # FLD: 21.3 % — HIGH (ref 11.5–14.5)
SODIUM SERPL-SCNC: 134 MMOL/L — LOW (ref 135–146)
SPECIMEN SOURCE: SIGNIFICANT CHANGE UP
SURGICAL PATHOLOGY STUDY: SIGNIFICANT CHANGE UP
WBC # BLD: 7.84 K/UL — SIGNIFICANT CHANGE UP (ref 4.8–10.8)
WBC # FLD AUTO: 7.84 K/UL — SIGNIFICANT CHANGE UP (ref 4.8–10.8)

## 2019-12-17 PROCEDURE — 99239 HOSP IP/OBS DSCHRG MGMT >30: CPT

## 2019-12-17 PROCEDURE — 99232 SBSQ HOSP IP/OBS MODERATE 35: CPT

## 2019-12-17 RX ORDER — PANTOPRAZOLE SODIUM 20 MG/1
1 TABLET, DELAYED RELEASE ORAL
Qty: 30 | Refills: 0
Start: 2019-12-17 | End: 2020-01-15

## 2019-12-17 RX ORDER — GLIMEPIRIDE 1 MG
1 TABLET ORAL
Qty: 30 | Refills: 0
Start: 2019-12-17 | End: 2020-01-15

## 2019-12-17 RX ORDER — CIPROFLOXACIN LACTATE 400MG/40ML
1 VIAL (ML) INTRAVENOUS
Qty: 8 | Refills: 0
Start: 2019-12-17 | End: 2019-12-20

## 2019-12-17 RX ORDER — ISOPROPYL ALCOHOL, BENZOCAINE .7; .06 ML/ML; ML/ML
1 SWAB TOPICAL
Qty: 100 | Refills: 1
Start: 2019-12-17 | End: 2020-02-04

## 2019-12-17 RX ADMIN — Medication 2: at 11:42

## 2019-12-17 RX ADMIN — Medication 0: at 08:20

## 2019-12-17 RX ADMIN — Medication 5 UNIT(S): at 08:14

## 2019-12-17 RX ADMIN — Medication 5 UNIT(S): at 11:42

## 2019-12-17 RX ADMIN — Medication 200 MILLIGRAM(S): at 05:16

## 2019-12-17 RX ADMIN — PANTOPRAZOLE SODIUM 40 MILLIGRAM(S): 20 TABLET, DELAYED RELEASE ORAL at 11:42

## 2019-12-17 NOTE — DISCHARGE NOTE NURSING/CASE MANAGEMENT/SOCIAL WORK - PATIENT PORTAL LINK FT
You can access the FollowMyHealth Patient Portal offered by [facility] by registering at the following website: [email or web address]. By joining [employer]’s FollowMyHealth portal, you will also be able to view your health information using other applications (apps) compatible with our system.

## 2019-12-17 NOTE — PROGRESS NOTE ADULT - SUBJECTIVE AND OBJECTIVE BOX
ANDREI MAHMOOD  49y  Male  ***My note supersedes ALL resident notes that I sign.  My corrections for their notes are in my note.***    I can be reached directly on [employer] [id number]. My office number is [phone]. My personal cell number is [phone].    INTERVAL EVENTS: Here for f/u panc mass. Pt feels well enough to go home. He did have some neck pain, which is better after PT. He still has residual b/l shoulder discomfort, which he is working through. Pt currently has a lapse in insurance, which he is working on. He will f/u w/ PT once insurance is estab.  For now, he will do exercises at home on his own. Pt can eat and drink. Has almost no pain in abd after stent/bx.    T(F): 97.7 (12-17-19 @ 04:50), Max: 97.7 (12-17-19 @ 04:50)  HR: 80 (12-17-19 @ 04:50) (66 - 80)  BP: 95/51 (12-17-19 @ 04:50) (95/51 - 126/65)  RR: 18 (12-17-19 @ 04:50) (16 - 118)  SpO2: 98% (12-17-19 @ 08:05) (98% - 100%)    Gen: NAD; + jaundice  HEENT: scl yellow; PERRL; EOMI; mouth clr  Neck: WNL  lungs: clr  hrt: s1 s2 rrr  abd soft, NT/ND  ext no edema, no c/c  neuro: WNL    LABS:                        10.4    (    87.6   7.84  )-----------( ---------      409      ( 17 Dec 2019 08:30 )             30.3    (    21.3     134   (   93   (   138      12-17-19 @ 08:30  ----------------------               3.5   (   23   (   12                             -----                        0.6  Ca  8.7   Mg  --    P   --     LFT  5.1  (  22.4  (  68       12-17-19 @ 08:30  -------------------------  2.9  (  348  (  80    PT/INR - ( 16 Dec 2019 07:27 )   PT: 11.00 sec;   INR: 0.96 ratio      CAPILLARY BLOOD GLUCOSE  POCT Blood Glucose.: 207 (12-17-19 @ 11:39)  POCT Blood Glucose.: 127 (12-17-19 @ 08:00)  POCT Blood Glucose.: 103 (12-16-19 @ 21:38)  POCT Blood Glucose.: 83 (12-16-19 @ 18:49)  POCT Blood Glucose.: 97 (12-16-19 @ 17:08)  POCT Blood Glucose.: 78 (12-16-19 @ 07:52)  POCT Blood Glucose.: 191 (12-16-19 @ 01:08)  POCT Blood Glucose.: 347 (12-15-19 @ 23:17)    RADIOLOGY & ADDITIONAL TESTS:      MEDICATIONS:  ciprofloxacin   IVPB 400 milliGRAM(s) IV Intermittent every 12 hours  ciprofloxacin   IVPB        acetaminophen   Tablet .. 650 milliGRAM(s) Oral once PRN  chlorhexidine 4% Liquid 1 Application(s) Topical <User Schedule>  enoxaparin Injectable 40 milliGRAM(s) SubCutaneous daily  glucagon  Injectable 1 milliGRAM(s) IntraMuscular once PRN  insulin glargine Injectable (LANTUS) 15 Unit(s) SubCutaneous at bedtime  insulin lispro (HumaLOG) corrective regimen sliding scale   SubCutaneous three times a day before meals  insulin lispro Injectable (HumaLOG) 5 Unit(s) SubCutaneous three times a day before meals  pantoprazole    Tablet 40 milliGRAM(s) Oral daily

## 2019-12-17 NOTE — CHART NOTE - NSCHARTNOTEFT_GEN_A_CORE
This morning he is resting comfortably in bed and reports no new issues or overnight events. He reports he is still feeling weak but denies any new complaints.    Vital Signs Last 24 Hrs  T(C): 36.6 (17 Dec 2019 13:27), Max: 36.6 (17 Dec 2019 13:27)  T(F): 97.9 (17 Dec 2019 13:27), Max: 97.9 (17 Dec 2019 13:27)  HR: 90 (17 Dec 2019 13:27) (66 - 90)  BP: 104/64 (17 Dec 2019 13:27) (95/51 - 126/65)  BP(mean): --  RR: 18 (17 Dec 2019 13:27) (16 - 118)  SpO2: 98% (17 Dec 2019 08:05) (98% - 100%)      GENERAL: NAD, lying in bed comfortably  ENT: Moist mucous membranes  NECK: Supple, No JVD  CHEST/LUNG: Clear to auscultation bilaterally; No rales, rhonchi, wheezing, or rubs. Unlabored respirations  HEART: Regular rate and rhythm; No murmurs, rubs, or gallops  ABDOMEN: Bowel sounds present; Soft, Nontender, Nondistended. No hepatomegally  EXTREMITIES:  2+ Peripheral Pulses, brisk capillary refill. No clubbing, cyanosis, or edema  NERVOUS SYSTEM:  Alert & Oriented X3, speech clear. No deficits

## 2019-12-17 NOTE — PROGRESS NOTE ADULT - ASSESSMENT
# Painless, obstructive jaundice: likely 2/2 panc mass  s/p ERCP w/ stenting and bx - f/u path as outpt  monitor LFTs w/ h/o as outpt - pt cannot have chemo until bili is better  if LFTs do not improve, pt will need to see adv GI team again  cipro 500mg po q12 for 4 more days as abx ppx for stenting procedure  CEA 10.3, CA 19-9: 617  no pain  PPI po for heartburn  h/o aware of pt and gave pt appt for outpt eval   OK to feed pt  surg onc can be done as outpt, if hem/onc wants to refer pt    # markedly distended stomach - no vomiting  could have been from diabetes not controlled  pt seems OK now  can use reglan prn    # Hyperglycemia: likely from panc insuff from mass; A1c 8.5  fs qac/hs and keep btw 100-180 - adjust insulin as needed  cont lant 15 and lisp 5/meal w/ +1 Cf scale - on d/c, use amaryl 1mg po qam (d/c metformin, until liver enz better) - outpt f/u w/ IM  AG improved   diabetic diet    # shoulder/neck pain 2/2 musculoskel strain  can see PT as outpt once insurance back in place    # DVT ppx: Lovenox    # GI ppx: ppi    # full code    Dispo: tx hyperglycemia; adv GI f/u; f/u path as outpt w/ h/o; pt will f/u at [facility] and can see SW there as well; d/c today to home     Prog: seems like it will be poor.

## 2019-12-17 NOTE — PROGRESS NOTE ADULT - REASON FOR ADMISSION
pancreatic mass

## 2019-12-17 NOTE — PROGRESS NOTE ADULT - SUBJECTIVE AND OBJECTIVE BOX
Patient is a 48 y/o with no known PMHx that presents to the ED for evaluation of jaundice. Patient found on imaging to have concern for a Pancreatic malignant process with appearance of possible Mets. Patient had EUS 12/16 with Multiple biopsies taken of Pancreatic head lesion ( Adequacy was confirmed by pathology on site). Patient also had Endoscopic ultrasound guided Biliary drainage with placement of a metallic cover 10 x 8 Biliary stent. Patient tolerated procedure well. No acute events overnight. Patient has some dull abdominal discomfort in Epigastrum but minimal.       PAST MEDICAL & SURGICAL HISTORY:  No pertinent past medical history  No significant past surgical history    Family Hx:  Father: Biliary stenting?   Mother: Non Contributory    Social History  Denies Current Tobacco use  Denies Current ETOH use  Denies Current Illicit Drug use           Allergies  No Known Allergies        Review of Systems  General:  See HPI  HEENT: Denies Trouble Swallowing ,Denies  Sore Throat , Denies Change in hearing/vision/speech ,Denies Dizziness    Cardio: Denies  Chest Pain , Palpitations    Respiratory: Denies worsening of SOB, Denies Cough  Abdomen: See detailed HPI  Neuro: Denies Headache Denies Dizziness, Denies Paresthesias  MSK: Denies pain in Bones/Joints/Muscles   Psych: Patient denies depression, denies suicidal or homicidal ideations  Integ: Patient Denies rash, or new skin lesions     Vital Signs:  T(F): 97.7 (17 Dec 2019 04:50), Max: 97.7 (16 Dec 2019 11:35)  HR: 80 (17 Dec 2019 04:50) (66 - 80)  BP: 95/51 (17 Dec 2019 04:50) (95/51 - 126/65)  RR: 18 (17 Dec 2019 04:50) (16 - 118)  SpO2: 98% (17 Dec 2019 08:05) (98% - 100%)  Physical Exam  Gen: NAD  HEENT: NC/AT, Icteric Sclera   Neck: Supple  Cardio: S1/S2 No S3/S4, Regular  Resp: CTA B/L  Abdomen: Soft, ND/NT  Neuro: AAOx3  Extremities: FROM x 4  Integ: Jaundice noted, excoriations likely from itching       LABS:                                   10.2   7.63  )-----------( 390      ( 16 Dec 2019 07:27 )             29.3       12-16    139  |  100  |  11  ----------------------------<  81  3.9   |  27  |  0.6<L>    Ca    8.9      16 Dec 2019 07:27    TPro  5.3<L>  /  Alb  3.1<L>  /  TBili  23.7<HH>  /  DBili  x   /  AST  57<H>  /  ALT  85<H>  /  AlkPhos  348<H>  12-16      RADIOLOGY & ADDITIONAL STUDIES:  CT Abdomen and Pelvis w/ IV Cont 12.11.19   IMPRESSION:     Severe pancreatic duct dilatation measuring 9 mm with distal pancreatic   atrophy.    Intrahepatic and extrahepatic biliary duct dilatation with the common   bile duct measuring up to 1.7 cm in the head of the pancreas.    Abrupt cut off of the pancreatic duct and common bile duct in the head of   pancreas, findings are highly suspicious for pancreatic head mass. A   discrete lesion is difficult to identify on this examination.    Infiltration of retroperitoneal fat around the aorta, IVC, and large   vessels off the aorta. Significant loss of retroperitoneal and mesenteric   fat. Infiltration may be related to ascites and edema. However, malignant   ascites or infiltration is not excluded.    Subcentimeter nodules in the left lung measuring up to 8 mm.    Severe loss of subcutaneous, retroperitoneal and mesenteric fat    Stomach is severely distended with material

## 2019-12-17 NOTE — PROGRESS NOTE ADULT - ASSESSMENT
Patient is a 48 y/o with no known PMHx that presents to the ED for evaluation of jaundice. Patient found on imaging to have concern for a Pancreatic malignant process with appearance of possible Mets. Patient had EUS 12/16 with Multiple biopsies taken of Pancreatic head lesion ( Adequacy was confirmed by pathology on site). Patient also had Endoscopic ultrasound guided Biliary drainage with placement of a metallic cover 10 x 8 Biliary stent. Patient tolerated procedure well. No acute events overnight. Patient has some dull abdominal discomfort in Epigastrum but minimal. At this point would have Oncology see him to setup follow up to reviwe pathology and treatment options. Would also consult Surgical Oncology for evaluation. Patient medically stable.     Abnormal CT/ Pancreatic Mass- pathology pending  - Labs were being drawn when I was bedside - will follow   - Oncology consult to establish follow up prior to discharge   - Surgical Oncology Consult , I don' think he is a surgical candidate based on imaging but would seek their expert advice  - After the above and  if levels are improving can anticipate discharge   - Mcneill placed on Pathology   - Will follow

## 2019-12-18 ENCOUNTER — INBOUND DOCUMENT (OUTPATIENT)
Age: 49
End: 2019-12-18

## 2019-12-18 LAB — NON-GYNECOLOGICAL CYTOLOGY STUDY: SIGNIFICANT CHANGE UP

## 2019-12-20 ENCOUNTER — APPOINTMENT (OUTPATIENT)
Dept: INTERNAL MEDICINE | Facility: CLINIC | Age: 49
End: 2019-12-20

## 2019-12-23 ENCOUNTER — OUTPATIENT (OUTPATIENT)
Dept: OUTPATIENT SERVICES | Facility: HOSPITAL | Age: 49
LOS: 1 days | Discharge: HOME | End: 2019-12-23

## 2019-12-23 ENCOUNTER — APPOINTMENT (OUTPATIENT)
Dept: INTERNAL MEDICINE | Facility: CLINIC | Age: 49
End: 2019-12-23
Payer: MEDICAID

## 2019-12-23 VITALS
HEIGHT: 65 IN | WEIGHT: 107 LBS | DIASTOLIC BLOOD PRESSURE: 63 MMHG | HEART RATE: 82 BPM | BODY MASS INDEX: 17.83 KG/M2 | SYSTOLIC BLOOD PRESSURE: 94 MMHG | TEMPERATURE: 96.8 F

## 2019-12-23 PROCEDURE — 99203 OFFICE O/P NEW LOW 30 MIN: CPT | Mod: GC

## 2019-12-23 NOTE — PHYSICAL EXAM
[No Acute Distress] : no acute distress [Normal Sclera/Conjunctiva] : normal sclera/conjunctiva [Normal Outer Ear/Nose] : the outer ears and nose were normal in appearance [No JVD] : no jugular venous distention [No Respiratory Distress] : no respiratory distress  [No Lymphadenopathy] : no lymphadenopathy [Normal Rate] : normal rate  [Clear to Auscultation] : lungs were clear to auscultation bilaterally [No Accessory Muscle Use] : no accessory muscle use [No Murmur] : no murmur heard [Normal S1, S2] : normal S1 and S2 [Regular Rhythm] : with a regular rhythm [No Edema] : there was no peripheral edema [Soft] : abdomen soft [Non-distended] : non-distended [Non Tender] : non-tender [No Masses] : no abdominal mass palpated [No Rash] : no rash

## 2019-12-24 ENCOUNTER — APPOINTMENT (OUTPATIENT)
Dept: HEMATOLOGY ONCOLOGY | Facility: CLINIC | Age: 49
End: 2019-12-24
Payer: MEDICAID

## 2019-12-24 ENCOUNTER — OUTPATIENT (OUTPATIENT)
Dept: OUTPATIENT SERVICES | Facility: HOSPITAL | Age: 49
LOS: 1 days | Discharge: HOME | End: 2019-12-24

## 2019-12-24 ENCOUNTER — LABORATORY RESULT (OUTPATIENT)
Age: 49
End: 2019-12-24

## 2019-12-24 ENCOUNTER — OTHER (OUTPATIENT)
Age: 49
End: 2019-12-24

## 2019-12-24 VITALS
TEMPERATURE: 99.7 F | RESPIRATION RATE: 16 BRPM | HEIGHT: 65 IN | SYSTOLIC BLOOD PRESSURE: 114 MMHG | WEIGHT: 107 LBS | HEART RATE: 98 BPM | BODY MASS INDEX: 17.83 KG/M2 | DIASTOLIC BLOOD PRESSURE: 67 MMHG

## 2019-12-24 DIAGNOSIS — Z87.891 PERSONAL HISTORY OF NICOTINE DEPENDENCE: ICD-10-CM

## 2019-12-24 DIAGNOSIS — C25.9 MALIGNANT NEOPLASM OF PANCREAS, UNSPECIFIED: ICD-10-CM

## 2019-12-24 PROCEDURE — 99205 OFFICE O/P NEW HI 60 MIN: CPT

## 2019-12-25 NOTE — ASSESSMENT
[FreeTextEntry1] : This patient is 48 y/o male with past medical history of diabetes on glimipride. He is here in clinic to follow up with his ERCP histopathology results, diagnosed with pancreatic cancer. He complains of loss of appetite, skin itching and significant weight loss. He has no other complaints. \par \par Assessment and plan \par \par 1- Pancreatic cancer \par - S/P stent placement \par - Heme/Onc referral and surgical oncology referral provided \par - Recheck CMP to monitor billirubin levels \par \par 2- Loss of appetite \par - We will prescribe ensure \par \par 3- DM \par - Continue glimipride \par \par Will follow up

## 2019-12-25 NOTE — HISTORY OF PRESENT ILLNESS
[FreeTextEntry1] : Establishment of primary care [de-identified] : This patient is 48 y/o male with past medical history of diabetes on glimipride. He is here in clinic to follow up with his ERCP histopathology results, diagnosed with pancreatic cancer. He complains of loss of appetite, skin itching and significant weight loss. He has no other complaints.

## 2019-12-25 NOTE — REVIEW OF SYSTEMS
[Itching] : itching [Fatigue] : fatigue [Negative] : Psychiatric [FreeTextEntry7] : decreased appetite

## 2019-12-26 PROBLEM — Z87.891 FORMER SMOKER: Status: ACTIVE | Noted: 2019-12-26

## 2019-12-26 LAB
ALBUMIN SERPL ELPH-MCNC: 3.5 G/DL
ALP BLD-CCNC: 239 U/L
ALT SERPL-CCNC: 54 U/L
ANION GAP SERPL CALC-SCNC: 14 MMOL/L
APTT BLD: 34.3 SEC
AST SERPL-CCNC: 35 U/L
BILIRUB SERPL-MCNC: 11 MG/DL
BUN SERPL-MCNC: 16 MG/DL
CALCIUM SERPL-MCNC: 8.6 MG/DL
CANCER AG19-9 SERPL-ACNC: 426 U/ML
CHLORIDE SERPL-SCNC: 93 MMOL/L
CO2 SERPL-SCNC: 26 MMOL/L
CREAT SERPL-MCNC: 0.5 MG/DL
GGT SERPL-CCNC: 113 U/L
GLUCOSE SERPL-MCNC: 532 MG/DL
HCT VFR BLD CALC: 29.8 %
HGB BLD-MCNC: 9.8 G/DL
INR PPP: 1 RATIO
MCHC RBC-ENTMCNC: 30.8 PG
MCHC RBC-ENTMCNC: 32.9 G/DL
MCV RBC AUTO: 93.7 FL
PLATELET # BLD AUTO: 460 K/UL
PMV BLD: 9.3 FL
POTASSIUM SERPL-SCNC: 4.1 MMOL/L
PROT SERPL-MCNC: 6 G/DL
PT BLD: 11.5 SEC
RBC # BLD: 3.18 M/UL
RBC # FLD: 16.7 %
SODIUM SERPL-SCNC: 133 MMOL/L
WBC # FLD AUTO: 10.18 K/UL

## 2019-12-26 NOTE — REASON FOR VISIT
[Initial Consultation] : an initial consultation [Family Member] : family member [FreeTextEntry2] : Pancreatic cancer

## 2019-12-26 NOTE — RESULTS/DATA
[FreeTextEntry1] : INTERPRETATION:  CLINICAL STATEMENT:  Pancreatic mass. Jaundice, weight \par loss.. Ductal mapping.\par \par TECHNIQUE: Axial in- and out-of-phase T1-weighted; axial fat-saturated \par T2-weighted; axial and coronal single-shot fast spin-echo T2-weighted; \par axial diffusion/ADC; axial 2-D fiesta fat sat; 2-D and 3-D heavily \par T2-weighted MRCP through the abdomen without IV contrast.\par \par COMPARISON:  CT abdomen/pelvis 12/11/2019.\par \par FINDINGS:\par Limited evaluation without intravenous contrast.\par \par HEPATOBILIARY: Grossly normal branching pattern of the biliary system. \par Moderate intrahepatic biliary ductal dilatation. Dilated common bile duct \par measures up to 1.4 cm with abrupt distal tapering. No evidence of \par choledocholithiasis. Portal vein and adjacent vascular structures not \par well evaluated without intravenous contrast.\par \par SPLEEN:  Unremarkable.\par \par PANCREAS:  Dilated main pancreatic duct measures up to 9 mm in the \par head/body. 2.3 x 2.1 cm diffusion abnormality seen in the pancreatic head \par region, just distal to the common bile duct cutoff.\par \par Distended gallbladder, likely due to biliary obstruction.\par \par ADRENAL GLANDS:  Unremarkable.\par \par KIDNEYS:  Unremarkable.\par \par ABDOMINAL NODES:  No adenopathy.\par \par BONES/SOFT TISSUES:  Unremarkable.\par \par OTHER:  Markedly distended stomach. Redemonstrated lingular nodule \par measuring 3 mm.\par \par \par IMPRESSION:\par \par Limited evaluation without intravenous contrast.\par \par Marked dilation of the intra and extrahepatic biliary ductal system as \par well as the pancreatic duct. \par \par 2.3 cm diffusion abnormality seen in the pancreatic head region, just \par distal to the common bile duct, which is nonspecific but could represent \par the primary mass (11:59). Recommend ERCP.\par \par No choledocholithiasis.\par \par Markedly distended stomach.\par \par Redemonstrated lingular nodule measuring 3 mm, indeterminate\par \par CT chest :\par \par FINDINGS:\par \par LOWER CHEST: There is a 8 mm nodule the lingula on image 17 of series 4. \par There is a 3 mm nodule at the left base in image 1 of series 4. There is \par a 4 mm nodule at the left base in image 38 of series 4. There are a few \par 1-2 mm pulmonary nodules at the left base.\par \par HEPATOBILIARY/PANCREAS: There is severe pancreatic duct dilatation \par measuring 9 mm with distal pancreatic atrophy. Abrupt cut off is \par identified within the region of the pancreatic head. There is \par intrahepatic and extra hepatic biliary duct dilatation. The common bile \par duct in the head of the pancreas measuring up to 1.7 cm and abrupt cut \par off at approximately the same level of abrupt cut off of the pancreatic \par duct dilatation. Findings are highly suspicious for a pancreatic head \par mass, but a discrete lesion is difficult to evaluate on this examination. \par At the level of the abrupt cut off of the common bile duct and pancreatic \par duct, the head of the pancreas measures approximately 2.8 x 2.5 cm and is \par diffusely heterogeneous. The suspected mass is within this location. The \par fat is completely infiltrated around the aorta and IVC and its branches \par with significant loss of retroperitoneal and mesenteric fat. Findings may \par be related to edema and ascites. However, malignant ascites or \par infiltration cannot be excluded The gallbladder is distended.\par \par SPLEEN: Unremarkable.\par \par ADRENAL GLANDS: Unremarkable.\par \par KIDNEYS: Subcentimeter hypodensities are too small to characterize. There \par is fullness to both collecting systems without definite hydronephrosis.\par \par ABDOMINOPELVIC NODES: Severely limited evaluation due to lack of \par intra-abdominal fat. No definite evidence of adenopathy.\par \par PELVIC ORGANS: The bladder is distended.\par \par PERITONEUM/MESENTERY/BOWEL: There is no free air or obstruction. The \par stomach is severely distended with material.\par \par BONES/SOFT TISSUES: Degenerative. There is severe loss of subcutaneous \par fat with subcutaneous edema..\par \par OTHER: Atherosclerosis.\par \par \par IMPRESSION: \par \par Severe pancreatic duct dilatation measuring 9 mm with distal pancreatic \par atrophy. \par \par Intrahepatic and extrahepatic biliary duct dilatation with the common \par bile duct measuring up to 1.7 cm in the head of the pancreas.\par \par Abrupt cut off of the pancreatic duct and common bile duct in the head of \par pancreas, findings are highly suspicious for pancreatic head mass. A \par discrete lesion is difficult to identify on this examination.\par \par Infiltration of retroperitoneal fat around the aorta, IVC, and large \par vessels off the aorta. Significant loss of retroperitoneal and mesenteric \par fat. Infiltration may be related to ascites and edema. However, malignant \par ascites or infiltration is not excluded.\par \par Subcentimeter nodules in the left lung measuring up to 8 mm.\par \par Severe loss of subcutaneous, retroperitoneal and mesenteric fat\par \par Stomach is severely distended with material\par \par \par \par

## 2019-12-26 NOTE — PHYSICAL EXAM
[Thin] : thin [Ambulatory and capable of all self care but unable to carry out any work activities] : Status 2- Ambulatory and capable of all self care but unable to carry out any work activities. Up and about more than 50% of waking hours [Normal] : RRR, normal S1S2, no murmurs, rubs, gallops [de-identified] : icterus [de-identified] : soft and not tender [de-identified] : yellow appearing

## 2019-12-26 NOTE — REVIEW OF SYSTEMS
[Fatigue] : fatigue [Shortness Of Breath] : shortness of breath [Recent Change In Weight] : ~T recent weight change [Abdominal Pain] : abdominal pain [Negative] : Heme/Lymph [FreeTextEntry3] : icterus

## 2019-12-26 NOTE — HISTORY OF PRESENT ILLNESS
[Disease: _____________________] : Disease: [unfilled] [de-identified] : Patient is a 48 y/o  male  with no known PMHx presented to the ED initially for evaluation\par of  painless jaundice. \par CT abdpmen and MRI abdomen showed \par -Marked dilation of the intra and extrahepatic biliary ductal system as \par well as the pancreatic duct. \par \par -2.3 cm diffusion abnormality seen in the pancreatic head region, just \par distal to the common bile duct\par - Lower lobe lung nodules. \par \par  Patient had EUS 12/16 with Multiple biopsies taken of Pancreatic head lesion and  Endoscopic ultrasound guided Biliary drainage with placement of a metallic cover 10 x 8 Biliary stent. biopsy showed \par \par PANCREATIC HEAD MASS, EUS-GUIDED FNA:\par - POSITIVE FOR MALIGNANT CELLS.\par - ADENOCARCINOMA.\par \par \par Post stent he is still jaundiced. He feels slightly better in terms of lethargy and fatigue. He has lost about 50 lbs over period of 6 months. \par Never a smoker and no family history of cancer. He is here today to discuss treatment options along with his sister. \par He is a retired . \par \par \par \par \par \par \par Final Diagnosis\par PANCREATIC HEAD MASS, EUS-GUIDED FNA:\par - POSITIVE FOR MALIGNANT CELLS.\par - ADENOCARCINOMA.\par \par  [de-identified] : CA 19-9 -610 [de-identified] : Adenocarcinoma

## 2019-12-26 NOTE — ASSESSMENT
[FreeTextEntry1] : In summary patient is 49 year old male with prior medical history now with locally advanced pancreatic cancer and pancreatic insufficiency. \par There is also concern for distant metastasis given the presence of pulmonary nodules. \par \par \par Recommendations\par - Discussed diagnosis and prognosis of pancreatic cancer. \par -IAt this point given the extent of disease, it appears that surgery may not be the option and chemotherapy would be the best bet. \par -However given his young age and no risk factors, recommended to go to [facility] or [facility] to see if he can be enrolled in a clinical trial. \par -In the mean time will get CT chest to better characterize the nodules. \par -Will repeat blood work today including bilirubin. If it is rising they he may need to be checked for stent patency. \par -Coming to treatment we discussed chemotherapy specifically modified FOLFORINOX and Freestone/ABraxane. \par -However the choice will be based on his bilirubin. If Bilirubin is still > 5 ULN, then gemcitabine monotherapy may be the option.\par -Will also arrange surgical oncology referral, though at this point it seems that it is not resectable.\par -Given his young age and no smoking history we talked about hereditary pancreatic cancers and testing for BRCA1/BRCA 2 etc. He would like to discuss this later. He does not have children. \par \par \par All questions answered in detail. \par \par Patient seen and examined with Dr. Jimenez

## 2019-12-27 ENCOUNTER — FORM ENCOUNTER (OUTPATIENT)
Age: 49
End: 2019-12-27

## 2019-12-27 DIAGNOSIS — E43 UNSPECIFIED SEVERE PROTEIN-CALORIE MALNUTRITION: ICD-10-CM
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
## 2019-12-27 DIAGNOSIS — E80.7 DISORDER OF BILIRUBIN METABOLISM, UNSPECIFIED: ICD-10-CM

## 2019-12-27 DIAGNOSIS — R64 CACHEXIA: ICD-10-CM

## 2019-12-27 DIAGNOSIS — C25.9 MALIGNANT NEOPLASM OF PANCREAS, UNSPECIFIED: ICD-10-CM
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
## 2019-12-27 DIAGNOSIS — K83.8 OTHER SPECIFIED DISEASES OF BILIARY TRACT: ICD-10-CM

## 2019-12-27 DIAGNOSIS — K31.84 GASTROPARESIS: ICD-10-CM

## 2019-12-27 DIAGNOSIS — S46.919A STRAIN OF UNSPECIFIED MUSCLE, FASCIA AND TENDON AT SHOULDER AND UPPER ARM LEVEL, UNSPECIFIED ARM, INITIAL ENCOUNTER: ICD-10-CM

## 2019-12-27 DIAGNOSIS — M25.611 STIFFNESS OF RIGHT SHOULDER, NOT ELSEWHERE CLASSIFIED: ICD-10-CM

## 2019-12-27 DIAGNOSIS — R17 UNSPECIFIED JAUNDICE: ICD-10-CM

## 2019-12-27 DIAGNOSIS — K83.1 OBSTRUCTION OF BILE DUCT: ICD-10-CM

## 2019-12-27 DIAGNOSIS — R73.9 HYPERGLYCEMIA, UNSPECIFIED: ICD-10-CM

## 2019-12-27 DIAGNOSIS — K86.89 OTHER SPECIFIED DISEASES OF PANCREAS: ICD-10-CM

## 2019-12-27 DIAGNOSIS — M25.612 STIFFNESS OF LEFT SHOULDER, NOT ELSEWHERE CLASSIFIED: ICD-10-CM

## 2019-12-28 ENCOUNTER — OUTPATIENT (OUTPATIENT)
Dept: OUTPATIENT SERVICES | Facility: HOSPITAL | Age: 49
LOS: 1 days | Discharge: HOME | End: 2019-12-28
Payer: MEDICAID

## 2019-12-28 DIAGNOSIS — C25.9 MALIGNANT NEOPLASM OF PANCREAS, UNSPECIFIED: ICD-10-CM

## 2019-12-28 PROCEDURE — 71260 CT THORAX DX C+: CPT | Mod: 26

## 2019-12-30 DIAGNOSIS — C25.9 MALIGNANT NEOPLASM OF PANCREAS, UNSPECIFIED: ICD-10-CM

## 2020-01-06 ENCOUNTER — APPOINTMENT (OUTPATIENT)
Dept: SURGERY | Facility: CLINIC | Age: 50
End: 2020-01-06
Payer: MEDICAID

## 2020-01-06 VITALS
HEIGHT: 65 IN | WEIGHT: 106 LBS | SYSTOLIC BLOOD PRESSURE: 119 MMHG | DIASTOLIC BLOOD PRESSURE: 78 MMHG | HEART RATE: 98 BPM | BODY MASS INDEX: 17.66 KG/M2 | TEMPERATURE: 98 F

## 2020-01-06 DIAGNOSIS — Z80.0 FAMILY HISTORY OF MALIGNANT NEOPLASM OF DIGESTIVE ORGANS: ICD-10-CM

## 2020-01-06 DIAGNOSIS — Z86.39 PERSONAL HISTORY OF OTHER ENDOCRINE, NUTRITIONAL AND METABOLIC DISEASE: ICD-10-CM

## 2020-01-06 DIAGNOSIS — Z78.9 OTHER SPECIFIED HEALTH STATUS: ICD-10-CM

## 2020-01-06 PROCEDURE — 99204 OFFICE O/P NEW MOD 45 MIN: CPT

## 2020-01-06 RX ORDER — LANCETS 28 GAUGE
EACH MISCELLANEOUS
Qty: 60 | Refills: 0 | Status: ACTIVE | COMMUNITY
Start: 2020-01-06 | End: 1900-01-01

## 2020-01-06 RX ORDER — BLOOD-GLUCOSE METER
W/DEVICE KIT MISCELLANEOUS
Qty: 1 | Refills: 0 | Status: ACTIVE | COMMUNITY
Start: 2020-01-06 | End: 1900-01-01

## 2020-01-06 RX ORDER — BLOOD SUGAR DIAGNOSTIC
STRIP MISCELLANEOUS
Qty: 60 | Refills: 0 | Status: ACTIVE | COMMUNITY
Start: 2020-01-06 | End: 1900-01-01

## 2020-01-06 RX ORDER — BLOOD-GLUCOSE METER
W/DEVICE EACH MISCELLANEOUS
Qty: 1 | Refills: 0 | Status: ACTIVE | COMMUNITY
Start: 2020-01-06 | End: 1900-01-01

## 2020-01-06 RX ORDER — ISOPROPYL ALCOHOL 70 ML/100ML
SWAB TOPICAL
Qty: 100 | Refills: 3 | Status: ACTIVE | COMMUNITY
Start: 2020-01-06 | End: 1900-01-01

## 2020-01-07 ENCOUNTER — OTHER (OUTPATIENT)
Age: 50
End: 2020-01-07

## 2020-01-07 ENCOUNTER — OUTPATIENT (OUTPATIENT)
Dept: OUTPATIENT SERVICES | Facility: HOSPITAL | Age: 50
LOS: 1 days | Discharge: HOME | End: 2020-01-07
Payer: MEDICAID

## 2020-01-07 VITALS
HEART RATE: 80 BPM | WEIGHT: 103.62 LBS | SYSTOLIC BLOOD PRESSURE: 102 MMHG | RESPIRATION RATE: 16 BRPM | TEMPERATURE: 98 F | OXYGEN SATURATION: 99 % | HEIGHT: 65 IN | DIASTOLIC BLOOD PRESSURE: 60 MMHG

## 2020-01-07 DIAGNOSIS — Z01.818 ENCOUNTER FOR OTHER PREPROCEDURAL EXAMINATION: ICD-10-CM

## 2020-01-07 DIAGNOSIS — C25.9 MALIGNANT NEOPLASM OF PANCREAS, UNSPECIFIED: ICD-10-CM

## 2020-01-07 DIAGNOSIS — Z96.89 PRESENCE OF OTHER SPECIFIED FUNCTIONAL IMPLANTS: Chronic | ICD-10-CM

## 2020-01-07 LAB
ALBUMIN SERPL ELPH-MCNC: 3.4 G/DL — LOW (ref 3.5–5.2)
ALP SERPL-CCNC: 138 U/L — HIGH (ref 30–115)
ALT FLD-CCNC: 32 U/L — SIGNIFICANT CHANGE UP (ref 0–41)
ANION GAP SERPL CALC-SCNC: 13 MMOL/L — SIGNIFICANT CHANGE UP (ref 7–14)
APTT BLD: 31.6 SEC — SIGNIFICANT CHANGE UP (ref 27–39.2)
AST SERPL-CCNC: 20 U/L — SIGNIFICANT CHANGE UP (ref 0–41)
BASOPHILS # BLD AUTO: 0.06 K/UL — SIGNIFICANT CHANGE UP (ref 0–0.2)
BASOPHILS NFR BLD AUTO: 0.7 % — SIGNIFICANT CHANGE UP (ref 0–1)
BILIRUB SERPL-MCNC: 5.1 MG/DL — HIGH (ref 0.2–1.2)
BLD GP AB SCN SERPL QL: SIGNIFICANT CHANGE UP
BUN SERPL-MCNC: 13 MG/DL — SIGNIFICANT CHANGE UP (ref 10–20)
CALCIUM SERPL-MCNC: 8.6 MG/DL — SIGNIFICANT CHANGE UP (ref 8.5–10.1)
CHLORIDE SERPL-SCNC: 98 MMOL/L — SIGNIFICANT CHANGE UP (ref 98–110)
CO2 SERPL-SCNC: 26 MMOL/L — SIGNIFICANT CHANGE UP (ref 17–32)
CREAT SERPL-MCNC: <0.5 MG/DL — LOW (ref 0.7–1.5)
EOSINOPHIL # BLD AUTO: 0.07 K/UL — SIGNIFICANT CHANGE UP (ref 0–0.7)
EOSINOPHIL NFR BLD AUTO: 0.8 % — SIGNIFICANT CHANGE UP (ref 0–8)
ESTIMATED AVERAGE GLUCOSE: 177 MG/DL — HIGH (ref 68–114)
GLUCOSE SERPL-MCNC: 209 MG/DL — HIGH (ref 70–99)
HBA1C BLD-MCNC: 7.8 % — HIGH (ref 4–5.6)
HCT VFR BLD CALC: 30.7 % — LOW (ref 42–52)
HGB BLD-MCNC: 10.5 G/DL — LOW (ref 14–18)
IMM GRANULOCYTES NFR BLD AUTO: 0.6 % — HIGH (ref 0.1–0.3)
INR BLD: 1.06 RATIO — SIGNIFICANT CHANGE UP (ref 0.65–1.3)
LYMPHOCYTES # BLD AUTO: 1.36 K/UL — SIGNIFICANT CHANGE UP (ref 1.2–3.4)
LYMPHOCYTES # BLD AUTO: 15.5 % — LOW (ref 20.5–51.1)
MCHC RBC-ENTMCNC: 31.4 PG — HIGH (ref 27–31)
MCHC RBC-ENTMCNC: 34.2 G/DL — SIGNIFICANT CHANGE UP (ref 32–37)
MCV RBC AUTO: 91.9 FL — SIGNIFICANT CHANGE UP (ref 80–94)
MONOCYTES # BLD AUTO: 0.87 K/UL — HIGH (ref 0.1–0.6)
MONOCYTES NFR BLD AUTO: 9.9 % — HIGH (ref 1.7–9.3)
NEUTROPHILS # BLD AUTO: 6.36 K/UL — SIGNIFICANT CHANGE UP (ref 1.4–6.5)
NEUTROPHILS NFR BLD AUTO: 72.5 % — SIGNIFICANT CHANGE UP (ref 42.2–75.2)
NRBC # BLD: 0 /100 WBCS — SIGNIFICANT CHANGE UP (ref 0–0)
PLATELET # BLD AUTO: 408 K/UL — HIGH (ref 130–400)
POTASSIUM SERPL-MCNC: 3.7 MMOL/L — SIGNIFICANT CHANGE UP (ref 3.5–5)
POTASSIUM SERPL-SCNC: 3.7 MMOL/L — SIGNIFICANT CHANGE UP (ref 3.5–5)
PROT SERPL-MCNC: 6.4 G/DL — SIGNIFICANT CHANGE UP (ref 6–8)
PROTHROM AB SERPL-ACNC: 12.2 SEC — SIGNIFICANT CHANGE UP (ref 9.95–12.87)
RBC # BLD: 3.34 M/UL — LOW (ref 4.7–6.1)
RBC # FLD: 15.1 % — HIGH (ref 11.5–14.5)
SODIUM SERPL-SCNC: 137 MMOL/L — SIGNIFICANT CHANGE UP (ref 135–146)
WBC # BLD: 8.77 K/UL — SIGNIFICANT CHANGE UP (ref 4.8–10.8)
WBC # FLD AUTO: 8.77 K/UL — SIGNIFICANT CHANGE UP (ref 4.8–10.8)

## 2020-01-07 PROCEDURE — 93010 ELECTROCARDIOGRAM REPORT: CPT

## 2020-01-07 NOTE — H&P PST ADULT - HISTORY OF PRESENT ILLNESS
48 Y/O FEMALE PRESENTS TO PAST WITH HX PANCREATIC CA    PT NOW FOR SCHEDULED PROCEDURE. PT DENIES ANY CP SOB PALP COUGH DYSURIA FEVER URI. PT ABLE TO TERRY 1-2 FOS W/O SOB 48 Y/O MALE PRESENTS TO PAST WITH HX PANCREATIC CA  PT C/O 50LB WGHT LOSS OVER PAST 6 MO  PT NOW FOR SCHEDULED PROCEDURE. PT DENIES ANY CP SOB PALP COUGH DYSURIA FEVER URI. PT ABLE TO TERRY 1-2 FOS W/O SOB

## 2020-01-08 PROBLEM — E11.9 TYPE 2 DIABETES MELLITUS WITHOUT COMPLICATIONS: Chronic | Status: ACTIVE | Noted: 2020-01-07

## 2020-01-09 ENCOUNTER — FORM ENCOUNTER (OUTPATIENT)
Age: 50
End: 2020-01-09

## 2020-01-09 NOTE — ASSESSMENT
[FreeTextEntry1] : 48 yo male patient with no significant medical history except for new onset DM. He developed diarrhea, steatorrhea and weight loss. He became jaundice and was admitted to the hospital for further work-up. His has appetite has decreased, lost 10 -15 lbs. He underwent imaging revealing a 2 cm masses in the head of the pancreas, uncinate process. CT chest with multiple pulmonary nodule, most likely postinflammatory but metastases can not be r/o. He underwent ERCP, EUS and FNA. Pathology was positive for adenocarcinoma. He is here today for evaluation and treatment recommendations.\par \par Assessment and Plan:\par \par Adenocarcinoma of the head of the pancreas, resectable on imaging. Questionable lung metastases. \par CT-PET ordered. A 19-9 426 when he was obstructed.\par \par Diagnostic laparoscopy, Whipple procedure offered. Risks, benefits, alternatives and long-term consequences of the procedure were fully discussed with the patient. Consent signed in my office today. Preoperative work-up ordered. \par \par All the questions were answered to their satisfaction and I encouraged the patient to call my office at anytime if they had any questions. Plan of care fully discussed with the patient.

## 2020-01-09 NOTE — HISTORY OF PRESENT ILLNESS
[de-identified] : 50 yo male patient with no significant medical history except for new onset DM. He developed diarrhea, steatorrhea and weight loss. He became jaundice and was admitted to the hospital for further work-up. His has appetite has decreased, lost 10 -15 lbs. He underwent imaging revealing a 2 cm masses in the head of the pancreas, uncinate process. CT chest with multiple pulmonary nodule, most likely postinflammatory but metastases can not be r/o. He underwent ERCP, EUS and FNA. Pathology was positive for adenocarcinoma. He is here today for evaluation and treatment recommendations.

## 2020-01-09 NOTE — PHYSICAL EXAM
[Normal] : supple, no neck mass and thyroid not enlarged [Normal Neck Lymph Nodes] : normal neck lymph nodes  [Normal Supraclavicular Lymph Nodes] : normal supraclavicular lymph nodes [Normal] : oriented to person, place and time, with appropriate affect [Normal Groin Lymph Nodes] : normal groin lymph nodes

## 2020-01-10 ENCOUNTER — APPOINTMENT (OUTPATIENT)
Dept: HEMATOLOGY ONCOLOGY | Facility: CLINIC | Age: 50
End: 2020-01-10

## 2020-01-10 ENCOUNTER — OUTPATIENT (OUTPATIENT)
Dept: OUTPATIENT SERVICES | Facility: HOSPITAL | Age: 50
LOS: 1 days | Discharge: HOME | End: 2020-01-10
Payer: MEDICAID

## 2020-01-10 DIAGNOSIS — Z96.89 PRESENCE OF OTHER SPECIFIED FUNCTIONAL IMPLANTS: Chronic | ICD-10-CM

## 2020-01-10 DIAGNOSIS — C25.9 MALIGNANT NEOPLASM OF PANCREAS, UNSPECIFIED: ICD-10-CM

## 2020-01-10 LAB — GLUCOSE BLDC GLUCOMTR-MCNC: 119 MG/DL — HIGH (ref 70–99)

## 2020-01-10 PROCEDURE — 78815 PET IMAGE W/CT SKULL-THIGH: CPT | Mod: 26,PI

## 2020-01-13 RX ORDER — METFORMIN HYDROCHLORIDE 500 MG/1
500 TABLET, COATED ORAL
Qty: 60 | Refills: 1 | Status: ACTIVE | COMMUNITY
Start: 2020-01-13 | End: 1900-01-01

## 2020-01-13 RX ORDER — SITAGLIPTIN 100 MG/1
100 TABLET, FILM COATED ORAL DAILY
Qty: 30 | Refills: 1 | Status: ACTIVE | COMMUNITY
Start: 2020-01-13 | End: 1900-01-01

## 2020-01-14 RX ORDER — BLOOD-GLUCOSE METER
KIT MISCELLANEOUS TWICE DAILY
Qty: 100 | Refills: 5 | Status: ACTIVE | COMMUNITY
Start: 2020-01-06 | End: 1900-01-01

## 2020-01-14 RX ORDER — GLIMEPIRIDE 1 MG/1
1 TABLET ORAL DAILY
Qty: 30 | Refills: 1 | Status: ACTIVE | COMMUNITY

## 2020-01-14 RX ORDER — LACTOSE-REDUCED FOOD
LIQUID (ML) ORAL 4 TIMES DAILY
Qty: 5 | Refills: 2 | Status: ACTIVE | COMMUNITY
Start: 2019-12-23

## 2020-01-16 ENCOUNTER — TRANSCRIPTION ENCOUNTER (OUTPATIENT)
Age: 50
End: 2020-01-16

## 2020-01-16 ENCOUNTER — RESULT REVIEW (OUTPATIENT)
Age: 50
End: 2020-01-16

## 2020-01-16 ENCOUNTER — APPOINTMENT (OUTPATIENT)
Dept: SURGERY | Facility: HOSPITAL | Age: 50
End: 2020-01-16

## 2020-01-16 ENCOUNTER — OUTPATIENT (OUTPATIENT)
Dept: OUTPATIENT SERVICES | Facility: HOSPITAL | Age: 50
LOS: 1 days | Discharge: HOME | End: 2020-01-16
Payer: MEDICAID

## 2020-01-16 VITALS
DIASTOLIC BLOOD PRESSURE: 74 MMHG | HEART RATE: 83 BPM | SYSTOLIC BLOOD PRESSURE: 111 MMHG | HEIGHT: 65 IN | RESPIRATION RATE: 18 BRPM | TEMPERATURE: 98 F | WEIGHT: 104.94 LBS

## 2020-01-16 VITALS — RESPIRATION RATE: 18 BRPM | DIASTOLIC BLOOD PRESSURE: 70 MMHG | HEART RATE: 70 BPM | SYSTOLIC BLOOD PRESSURE: 118 MMHG

## 2020-01-16 DIAGNOSIS — Z96.89 PRESENCE OF OTHER SPECIFIED FUNCTIONAL IMPLANTS: Chronic | ICD-10-CM

## 2020-01-16 LAB — GLUCOSE BLDC GLUCOMTR-MCNC: 230 MG/DL — HIGH (ref 70–99)

## 2020-01-16 PROCEDURE — 88305 TISSUE EXAM BY PATHOLOGIST: CPT | Mod: 26

## 2020-01-16 PROCEDURE — 49320 DIAG LAPARO SEPARATE PROC: CPT

## 2020-01-16 PROCEDURE — 88112 CYTOPATH CELL ENHANCE TECH: CPT | Mod: 26

## 2020-01-16 RX ORDER — MORPHINE SULFATE 50 MG/1
4 CAPSULE, EXTENDED RELEASE ORAL
Refills: 0 | Status: DISCONTINUED | OUTPATIENT
Start: 2020-01-16 | End: 2020-01-17

## 2020-01-16 RX ORDER — MAGNESIUM CHLORIDE
0 CRYSTALS MISCELLANEOUS
Qty: 0 | Refills: 0 | DISCHARGE

## 2020-01-16 RX ORDER — CHOLECALCIFEROL (VITAMIN D3) 125 MCG
1 CAPSULE ORAL
Qty: 0 | Refills: 0 | DISCHARGE

## 2020-01-16 RX ORDER — SODIUM CHLORIDE 9 MG/ML
1000 INJECTION, SOLUTION INTRAVENOUS
Refills: 0 | Status: DISCONTINUED | OUTPATIENT
Start: 2020-01-16 | End: 2020-01-17

## 2020-01-16 RX ORDER — FERROUS SULFATE 325(65) MG
0 TABLET ORAL
Qty: 0 | Refills: 0 | DISCHARGE

## 2020-01-16 RX ORDER — METFORMIN HYDROCHLORIDE 850 MG/1
1 TABLET ORAL
Qty: 0 | Refills: 0 | DISCHARGE

## 2020-01-16 RX ADMIN — SODIUM CHLORIDE 100 MILLILITER(S): 9 INJECTION, SOLUTION INTRAVENOUS at 10:15

## 2020-01-16 NOTE — BRIEF OPERATIVE NOTE - OPERATION/FINDINGS
Upper and lower abdominal ascites sent as 2 separate specimens for cytology. No gross evidence of peritoneal, liver, or other metastatic implants visualized

## 2020-01-16 NOTE — CHART NOTE - NSCHARTNOTEFT_GEN_A_CORE
PACU ANESTHESIA ADMISSION NOTE      Procedure: Diagnostic laparoscopy: with peritoneal washings x2    Post op diagnosis:  Pancreatic cancer      ____  Intubated  TV:______       Rate: ______      FiO2: ______    _x___  Patent Airway    _x___  Full return of protective reflexes    _x___  Full recovery from anesthesia / back to baseline status    Vitals:  T(C): 36.8 (01-16-20 @ 08:05), Max: 36.8 (01-16-20 @ 07:40)  HR: 83 (01-16-20 @ 08:05) (83 - 83)  BP: 111/74 (01-16-20 @ 08:05) (111/74 - 111/74)  RR: 18 (01-16-20 @ 08:05) (18 - 18)  SpO2: --    Mental Status:  _x___ Awake   _____ Alert   _____ Drowsy   _____ Sedated    Nausea/Vomiting:  _x___  NO       ______Yes,   See Post - Op Orders         Pain Scale (0-10):  __0___    Treatment: _x___ None    ____ See Post - Op/PCA Orders    Post - Operative Fluids:   __x__ Oral   ____ See Post - Op Orders    Plan: Discharge:   _x___Home       _____Floor     _____Critical Care    _____  Other:_________________    Comments:  No anesthesia issues or complications noted.  Discharge when criteria met.

## 2020-01-16 NOTE — ASU DISCHARGE PLAN (ADULT/PEDIATRIC) - ASU DC SPECIAL INSTRUCTIONSFT
No heavy lifting for the next 6 weeks.  Percocet sent to your pharmacy; take as needed for pain.  DO NOT DRIVE WHILE TAKING PRESCRIPTION PAIN MEDICATION  Otherwise, you may take tylenol and motrin as tolerated.   You may shower after 2 days; dressing is waterproof.  You may remove the dressing after 3 days.  Steri strips underneath dressing are meant to stay in tact; they will fall off on their own  Please follow up with Dr. Maya as scheduled, within the next two weeks. Call his office number to schedule an appointment.

## 2020-01-16 NOTE — ASU DISCHARGE PLAN (ADULT/PEDIATRIC) - CARE PROVIDER_API CALL
Ivan Maya)  Surgery  [address] [place] [address], [address] [place] [address]  Phone: [phone]  Fax: [phone]  Follow Up Time:

## 2020-01-20 LAB
NON-GYNECOLOGICAL CYTOLOGY STUDY: SIGNIFICANT CHANGE UP
NON-GYNECOLOGICAL CYTOLOGY STUDY: SIGNIFICANT CHANGE UP

## 2020-01-21 ENCOUNTER — APPOINTMENT (OUTPATIENT)
Dept: HEMATOLOGY ONCOLOGY | Facility: CLINIC | Age: 50
End: 2020-01-21

## 2020-01-21 NOTE — PHYSICAL EXAM
[Normal] : supple, no neck mass and thyroid not enlarged [Normal Neck Lymph Nodes] : normal neck lymph nodes  [Normal Supraclavicular Lymph Nodes] : normal supraclavicular lymph nodes [Normal Groin Lymph Nodes] : normal groin lymph nodes [Normal] : oriented to person, place and time, with appropriate affect

## 2020-01-22 ENCOUNTER — APPOINTMENT (OUTPATIENT)
Dept: SURGERY | Facility: CLINIC | Age: 50
End: 2020-01-22
Payer: MEDICAID

## 2020-01-22 VITALS
HEIGHT: 65 IN | HEART RATE: 73 BPM | WEIGHT: 108 LBS | TEMPERATURE: 98 F | DIASTOLIC BLOOD PRESSURE: 70 MMHG | SYSTOLIC BLOOD PRESSURE: 122 MMHG | BODY MASS INDEX: 17.99 KG/M2

## 2020-01-22 DIAGNOSIS — C25.9 MALIGNANT NEOPLASM OF PANCREAS, UNSPECIFIED: ICD-10-CM

## 2020-01-22 DIAGNOSIS — C25.0 MALIGNANT NEOPLASM OF HEAD OF PANCREAS: ICD-10-CM

## 2020-01-22 DIAGNOSIS — E11.9 TYPE 2 DIABETES MELLITUS WITHOUT COMPLICATIONS: ICD-10-CM

## 2020-01-22 DIAGNOSIS — Z85.07 PERSONAL HISTORY OF MALIGNANT NEOPLASM OF PANCREAS: ICD-10-CM

## 2020-01-22 DIAGNOSIS — C78.7 SECONDARY MALIGNANT NEOPLASM OF LIVER AND INTRAHEPATIC BILE DUCT: ICD-10-CM

## 2020-01-22 DIAGNOSIS — Z79.84 LONG TERM (CURRENT) USE OF ORAL HYPOGLYCEMIC DRUGS: ICD-10-CM

## 2020-01-22 PROCEDURE — 99024 POSTOP FOLLOW-UP VISIT: CPT

## 2020-01-23 PROBLEM — Z85.07: Status: RESOLVED | Noted: 2020-01-23 | Resolved: 2020-01-23

## 2020-01-23 NOTE — REASON FOR VISIT
[Follow-Up Visit] : a follow-up visit for [Pancreatic Cancer] : pancreatic cancer [Family Member] : family member [FreeTextEntry2] : and liver metastases

## 2020-01-23 NOTE — ASSESSMENT
[FreeTextEntry1] : 48 yo male patient with no significant medical history except for new onset DM. He developed diarrhea, steatorrhea and weight loss. He became jaundice and was admitted to the hospital for further work-up. His has appetite has decreased, lost 10 -15 lbs. He underwent imaging revealing a 2 cm masses in the head of the pancreas, uncinate process. CT chest with multiple pulmonary nodule, most likely postinflammatory but metastases can not be r/o. He underwent ERCP, EUS and FNA. Pathology was positive for adenocarcinoma. CT-PET revealed a PET positive pancreas mass and a mass in the caudate lobe or perihilar lymphadenopathy, not seen on CT or MRI.\par \par He is here today for evaluation and treatment recommendations after he underwent diagnostic laparoscopy on January 16, 2020. Peritoneal cytologies negative and no carcinomatosis. he went home the same of surgery. Asymptomatic at this point.\par \par Assessment and Plan:\par \par Adenocarcinoma of the head of the pancreas, resectable on imaging. Questionable lung metastases. \par CT-PET ordered. A 19-9 426 when he was obstructed.\par \par Diagnostic laparoscopy done on January 16, 2020. No metastatic disease seen and negative cytologies. Seen at [facility] yesterday and will start chemotherapy this week.\par \par He can continue regular diet and return to regular activities in a week. Return to office as needed.\par \par All the questions were answered to their satisfaction and I encouraged the patient to call my office at anytime if he had any questions. Plan of care fully discussed with the patient.

## 2020-01-23 NOTE — HISTORY OF PRESENT ILLNESS
[de-identified] : 48 yo male patient with no significant medical history except for new onset DM. He developed diarrhea, steatorrhea and weight loss. He became jaundice and was admitted to the hospital for further work-up. His has appetite has decreased, lost 10 -15 lbs. He underwent imaging revealing a 2 cm masses in the head of the pancreas, uncinate process. CT chest with multiple pulmonary nodule, most likely postinflammatory but metastases can not be r/o. He underwent ERCP, EUS and FNA. Pathology was positive for adenocarcinoma. CT-PET revealed a PET positive pancreas mass and a mass in the caudate lobe or perihilar lymphadenopathy, not seen on CT or MRI.\par \par He is here today for evaluation and treatment recommendations after he underwent diagnostic laparoscopy on January 16, 2020. Peritoneal cytologies negative and no carcinomatosis. he went home the same of surgery. Asymptomatic at this point.\par

## 2020-02-12 ENCOUNTER — APPOINTMENT (OUTPATIENT)
Dept: INTERNAL MEDICINE | Facility: CLINIC | Age: 50
End: 2020-02-12

## 2020-12-07 ENCOUNTER — APPOINTMENT (OUTPATIENT)
Dept: SURGERY | Facility: CLINIC | Age: 50
End: 2020-12-07

## 2022-07-27 NOTE — DISCHARGE NOTE PROVIDER - HOSPITAL COURSE
49 year old M with no known PMHx presents to the ED for evaluation of jaundice. patient reports he noticed diffuse jaundice 2 weeks ago with dark urine and stool. he also endorses generalized weakness over the past 1 month and loss of tremendous amount of wt over the past 6 months. Patient also endorsed mild 2/10 dull aching pain in the right upper quadrant, non-radiating, waxes and wanes for 2 weeks.     CT abdomen is highly suspicious of pancreatic head mass with CBD dilation to 1.7, pancreatic duct dilation, intra and extra hepatic duct dilation, CEA 10.3, Ca 19-9: 617        Patient had EUS 12/16 with Multiple biopsies taken of Pancreatic head lesion ( Adequacy was confirmed by pathology on site). Patient also had Endoscopic ultrasound guided Biliary drainage with placement of a metallic cover 10 x 8 Biliary stent. Patient tolerated procedure well and will be discharged with cipro and diabetes meds. Negative Screen